# Patient Record
Sex: FEMALE | Race: WHITE | NOT HISPANIC OR LATINO | Employment: FULL TIME | URBAN - METROPOLITAN AREA
[De-identification: names, ages, dates, MRNs, and addresses within clinical notes are randomized per-mention and may not be internally consistent; named-entity substitution may affect disease eponyms.]

---

## 2017-10-31 ENCOUNTER — OFFICE VISIT (OUTPATIENT)
Dept: MEDICAL GROUP | Facility: MEDICAL CENTER | Age: 62
End: 2017-10-31
Payer: COMMERCIAL

## 2017-10-31 ENCOUNTER — HOSPITAL ENCOUNTER (OUTPATIENT)
Dept: LAB | Facility: MEDICAL CENTER | Age: 62
End: 2017-10-31
Attending: NURSE PRACTITIONER
Payer: COMMERCIAL

## 2017-10-31 VITALS
SYSTOLIC BLOOD PRESSURE: 130 MMHG | DIASTOLIC BLOOD PRESSURE: 76 MMHG | WEIGHT: 188 LBS | HEIGHT: 66 IN | HEART RATE: 88 BPM | RESPIRATION RATE: 16 BRPM | TEMPERATURE: 98.5 F | BODY MASS INDEX: 30.22 KG/M2 | OXYGEN SATURATION: 94 %

## 2017-10-31 DIAGNOSIS — Z85.6: ICD-10-CM

## 2017-10-31 DIAGNOSIS — Z12.11 SCREEN FOR COLON CANCER: ICD-10-CM

## 2017-10-31 DIAGNOSIS — Z94.6 BONE TRANSPLANT STATUS: ICD-10-CM

## 2017-10-31 DIAGNOSIS — J30.1 CHRONIC SEASONAL ALLERGIC RHINITIS DUE TO POLLEN: ICD-10-CM

## 2017-10-31 DIAGNOSIS — E66.9 OBESITY (BMI 30-39.9): ICD-10-CM

## 2017-10-31 DIAGNOSIS — Z12.39 SCREENING FOR MALIGNANT NEOPLASM OF BREAST: ICD-10-CM

## 2017-10-31 PROBLEM — E78.2 MIXED HYPERLIPIDEMIA: Status: ACTIVE | Noted: 2017-10-31

## 2017-10-31 LAB
ALBUMIN SERPL BCP-MCNC: 4.4 G/DL (ref 3.2–4.9)
ALBUMIN/GLOB SERPL: 1.3 G/DL
ALP SERPL-CCNC: 77 U/L (ref 30–99)
ALT SERPL-CCNC: 25 U/L (ref 2–50)
ANION GAP SERPL CALC-SCNC: 7 MMOL/L (ref 0–11.9)
AST SERPL-CCNC: 20 U/L (ref 12–45)
BASOPHILS # BLD AUTO: 0.6 % (ref 0–1.8)
BASOPHILS # BLD: 0.06 K/UL (ref 0–0.12)
BILIRUB SERPL-MCNC: 0.4 MG/DL (ref 0.1–1.5)
BUN SERPL-MCNC: 14 MG/DL (ref 8–22)
CALCIUM SERPL-MCNC: 9.6 MG/DL (ref 8.5–10.5)
CHLORIDE SERPL-SCNC: 107 MMOL/L (ref 96–112)
CO2 SERPL-SCNC: 28 MMOL/L (ref 20–33)
CREAT SERPL-MCNC: 0.53 MG/DL (ref 0.5–1.4)
EOSINOPHIL # BLD AUTO: 0.05 K/UL (ref 0–0.51)
EOSINOPHIL NFR BLD: 0.5 % (ref 0–6.9)
ERYTHROCYTE [DISTWIDTH] IN BLOOD BY AUTOMATED COUNT: 51.3 FL (ref 35.9–50)
GFR SERPL CREATININE-BSD FRML MDRD: >60 ML/MIN/1.73 M 2
GLOBULIN SER CALC-MCNC: 3.4 G/DL (ref 1.9–3.5)
GLUCOSE SERPL-MCNC: 91 MG/DL (ref 65–99)
HCT VFR BLD AUTO: 44.2 % (ref 37–47)
HGB BLD-MCNC: 14.8 G/DL (ref 12–16)
IMM GRANULOCYTES # BLD AUTO: 0.04 K/UL (ref 0–0.11)
IMM GRANULOCYTES NFR BLD AUTO: 0.4 % (ref 0–0.9)
LYMPHOCYTES # BLD AUTO: 2.79 K/UL (ref 1–4.8)
LYMPHOCYTES NFR BLD: 27.6 % (ref 22–41)
MCH RBC QN AUTO: 32.8 PG (ref 27–33)
MCHC RBC AUTO-ENTMCNC: 33.5 G/DL (ref 33.6–35)
MCV RBC AUTO: 98 FL (ref 81.4–97.8)
MONOCYTES # BLD AUTO: 0.89 K/UL (ref 0–0.85)
MONOCYTES NFR BLD AUTO: 8.8 % (ref 0–13.4)
NEUTROPHILS # BLD AUTO: 6.29 K/UL (ref 2–7.15)
NEUTROPHILS NFR BLD: 62.1 % (ref 44–72)
NRBC # BLD AUTO: 0 K/UL
NRBC BLD AUTO-RTO: 0 /100 WBC
PLATELET # BLD AUTO: 418 K/UL (ref 164–446)
PMV BLD AUTO: 9.2 FL (ref 9–12.9)
POTASSIUM SERPL-SCNC: 4.2 MMOL/L (ref 3.6–5.5)
PROT SERPL-MCNC: 7.8 G/DL (ref 6–8.2)
RBC # BLD AUTO: 4.51 M/UL (ref 4.2–5.4)
SODIUM SERPL-SCNC: 142 MMOL/L (ref 135–145)
WBC # BLD AUTO: 10.1 K/UL (ref 4.8–10.8)

## 2017-10-31 PROCEDURE — 80053 COMPREHEN METABOLIC PANEL: CPT

## 2017-10-31 PROCEDURE — 36415 COLL VENOUS BLD VENIPUNCTURE: CPT

## 2017-10-31 PROCEDURE — 99204 OFFICE O/P NEW MOD 45 MIN: CPT | Performed by: NURSE PRACTITIONER

## 2017-10-31 PROCEDURE — 85025 COMPLETE CBC W/AUTO DIFF WBC: CPT

## 2017-10-31 NOTE — LETTER
[employer] [facility]  BENIGNO GillespieRMACARENA.  [address]  [place] [address]  Fax: [phone]   Authorization for Release/Disclosure of   Protected Health Information   Name: ADOLFO VAUGHN : 1955 SSN: [id number]   Address: [address] [place] [id number] Phone:    [phone] (home) [phone] (work)   I authorize the entity listed below to release/disclose the PHI below to:   [facility]/BENIGNO GilelspieRSIA and KERRI Gillespie   Provider or Entity Name:  Alondra Schultz   Address   City, State, Zip   Phone:      Fax:     Reason for request: continuity of care   Information to be released:    [  ] LAST COLONOSCOPY,  including any PATH REPORT and follow-up  [  ] LAST FIT/COLOGUARD RESULT [  ] LAST DEXA  [  ] LAST MAMMOGRAM  [  ] LAST PAP  [  ] LAST LABS [  ] RETINA EXAM REPORT  [  ] IMMUNIZATION RECORDS  [ x ] Release all info      [  ] Check here and initial the line next to each item to release ALL health information INCLUDING  _____ Care and treatment for drug and / or alcohol abuse  _____ HIV testing, infection status, or AIDS  _____ Genetic Testing    DATES OF SERVICE OR TIME PERIOD TO BE DISCLOSED: _____________  I understand and acknowledge that:  * This Authorization may be revoked at any time by you in writing, except if your health information has already been used or disclosed.  * Your health information that will be used or disclosed as a result of you signing this authorization could be re-disclosed by the recipient. If this occurs, your re-disclosed health information may no longer be protected by State or Federal laws.  * You may refuse to sign this Authorization. Your refusal will not affect your ability to obtain treatment.  * This Authorization becomes effective upon signing and will  on (date) __________.      If no date is indicated, this Authorization will  one (1) year from the signature date.    Name: Adolfo  Gigi    Signature:   Date:     10/31/2017       PLEASE FAX REQUESTED RECORDS BACK TO: [phone]

## 2017-10-31 NOTE — PROGRESS NOTES
"cc:  release for dental work    Subjective:     HPI:     Samantha Yu is a 62 y.o. female here to Establish care and to discuss the evaluation and management of:    1. Hx of acute myeloid leukemia in remission  Patient states that she had myelodysplastic leukemia, and had a bone marrow transplant from her sister in 2002. She was cared for in [place] during this time. Since she's moved to [place] 2007 she has not followed up regularly with an oncologist. States she's she has done so one time and was told she was in remission and has since routinely been monitored. Does not recall the name of the oncologist. She states she was told \"she had an impressive bone scan \" from the oncologist Patient needs a crown replaced on her tooth tomorrow and her dentist is requesting her to have a release from her doctor based on her history of leukemia.    2. Chronic seasonal allergic rhinitis due to pollen  States she suffers from seasonal allergies. Occasionally uses Zyrtec and Flonase but not regularly. Complains of a stuffy nose, denies any dental pressure, sinus pressure, itchy eyes, watery nose, cough,or headache.    3. Screen for colon cancer  States she does not want a colonoscopy but is willing to try fit test. Denies any bowel changes such as blood in her stool, black tarry stools, constipation, abdominal pain or diarrhea.    4. Screening for malignant neoplasm of breast  States she is needs a mammogram. Does not remember where her records are of her previous mammo. Denies any presence of lumps, nipple discharge, breast tenderness, or dimpling.          ROS:  Denies any Headache, Blurred Vision, Confusion Chest pain,  Shortness of breath,  Abdominal pain, Changes of bowel or bladder, Lower ext edema-states she did hit her left lower shin on some stairs back in June and it caused her second toe on her left foot to be numb, the numbness went away when she was swimming during the summer, denies swelling in foot, or a open " "wound or foot drop, states sore sometime to the touch on shin, denies Fevers, Nights sweats, Weight Changes, Focal weakness or numbness.  All other systems are negative.      No current outpatient prescriptions on file.    No Known Allergies    Past Medical History:   Diagnosis Date   • Glaucoma    • Murmur    • Seasonal allergies      Past Surgical History:   Procedure Laterality Date   • TUBAL LIGATION  1996   • EYE SURGERY Bilateral     d/t pressure in eye from glaucoma     Family History   Problem Relation Age of Onset   • Cancer Mother      breast   • Cancer Maternal Grandmother      breast   • Heart Attack Paternal Grandfather    • Diabetes Paternal Grandfather      Social History     Social History   • Marital status:      Spouse name: N/A   • Number of children: N/A   • Years of education: N/A     Occupational History   • Not on file.     Social History Main Topics   • Smoking status: Never Smoker   • Smokeless tobacco: Never Used   • Alcohol use 0.6 oz/week     1 Glasses of wine per week   • Drug use: No   • Sexual activity: Yes     Partners: Male     Other Topics Concern   • Not on file     Social History Narrative   • No narrative on file       Objective:     Vitals: /76   Pulse 88   Temp 36.9 °C (98.5 °F)   Resp 16   Ht 1.676 m (5' 6\")   Wt 85.3 kg (188 lb)   SpO2 94%   BMI 30.34 kg/m²    General: Alert, pleasant, NAD  HEENT: Normocephalic.  PERRL,no icterus or pallor.  Conjunctivae and lids normal. External ears normal. Tympanic membranes pearly, opaque.  Mild turbinate edema, mucosa pink.  Oropharynx non-erythematous, mucous membranes moist.  Neck supple.  No thyromegaly or masses palpated. No cervical or supraclavicular lymphadenopathy.  Heart: Regular rate and rhythm.  S1 and S2 normal.  No murmurs appreciated.  Respiratory: Normal respiratory effort.  Clear to auscultation bilaterally.  Abdomen: Non-distended, soft, non-tender, no guarding/rebound. Bowel sounds present.  No " hepatosplenomegaly.  No hernias.  Skin: Warm, dry, no rashes.  Musculoskeletal: Gait is normal.  Moves all extremities well. Tenderness to left lower anterior shin, no evidence of ecchymosis or erythema or crepitus  Extremities: No leg edema.  Pedal pulses 2+ symmetric.   Neurological: No tremors, sensation grossly intact, patellar reflexes 2+ symmetric, tone/strength normal, gait is normal  Psych:  Affect/mood is normal, judgement is good, memory is intact, grooming is appropriate.    Assessment/Plan:     Samantha was seen today for establish care and referral needed.    Diagnoses and all orders for this visit:    Hx of acute myeloid leukemia in remission  Patient needs approval for dental work. Discussed with patient that in order for me to give her medical clearance we should at least have some labs that can show active disease or not. Pt will obtain labs today and once lab results are uploaded I will notify patient.  -     COMP METABOLIC PANEL; Future  -     CBC WITH DIFFERENTIAL; Future    Chronic seasonal allergic rhinitis due to pollen   Chronic. Continue Flonase and Zyrtec, discussed option of using saline rinses as well. Follow-up as needed  Screen for colon cancer  Patient denies referral for colonoscopy states that she will attempt to do the FIT test. Denies any bowel changes, constipation, blood in her stool, black or tarry stools, abdominal pain, or hemorrhoids.    -     OCCULT BLOOD FECES IMMUNOASSAY (FIT); Future    Screening for malignant neoplasm of breast  Does not have any current records or is aware of where her most recent mammogram was however states it's been several years.    -     MA-SCREEN MAMMO W/CAD-BILAT; Future    Obesity (BMI 30-39.9)  Chronic. Mild obesity. Discussed increasing exercise routine and maintaining a heart healthy diet.  -     Patient identified as having weight management issue.  Appropriate orders and counseling given.        Return in about 1 month (around  11/30/2017).        Amalia MANNING

## 2017-11-02 PROBLEM — Z94.6: Status: ACTIVE | Noted: 2017-11-02

## 2018-08-16 ENCOUNTER — HOSPITAL ENCOUNTER (OUTPATIENT)
Dept: RADIOLOGY | Facility: MEDICAL CENTER | Age: 63
End: 2018-08-16

## 2018-08-16 ENCOUNTER — OFFICE VISIT (OUTPATIENT)
Dept: MEDICAL GROUP | Facility: MEDICAL CENTER | Age: 63
End: 2018-08-16
Payer: COMMERCIAL

## 2018-08-16 ENCOUNTER — HOSPITAL ENCOUNTER (OUTPATIENT)
Dept: RADIOLOGY | Facility: MEDICAL CENTER | Age: 63
End: 2018-08-16
Attending: NURSE PRACTITIONER
Payer: COMMERCIAL

## 2018-08-16 VITALS
HEIGHT: 66 IN | DIASTOLIC BLOOD PRESSURE: 80 MMHG | BODY MASS INDEX: 32.14 KG/M2 | WEIGHT: 200 LBS | RESPIRATION RATE: 14 BRPM | TEMPERATURE: 98.9 F | OXYGEN SATURATION: 98 % | SYSTOLIC BLOOD PRESSURE: 126 MMHG | HEART RATE: 84 BPM

## 2018-08-16 DIAGNOSIS — Z12.39 SCREENING FOR MALIGNANT NEOPLASM OF BREAST: ICD-10-CM
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
## 2018-08-16 DIAGNOSIS — L03.011 CELLULITIS OF RIGHT MIDDLE FINGER: ICD-10-CM

## 2018-08-16 PROCEDURE — 99213 OFFICE O/P EST LOW 20 MIN: CPT | Performed by: FAMILY MEDICINE

## 2018-08-16 PROCEDURE — 77067 SCR MAMMO BI INCL CAD: CPT

## 2018-08-16 RX ORDER — AMOXICILLIN AND CLAVULANATE POTASSIUM 875; 125 MG/1; MG/1
1 TABLET, FILM COATED ORAL 2 TIMES DAILY
Qty: 20 TAB | Refills: 0 | Status: SHIPPED | OUTPATIENT
Start: 2018-08-16 | End: 2018-08-20

## 2018-08-16 ASSESSMENT — PATIENT HEALTH QUESTIONNAIRE - PHQ9: CLINICAL INTERPRETATION OF PHQ2 SCORE: 0

## 2018-08-16 NOTE — PROGRESS NOTES
"CC: Cat Bite 5 days ago    HPI:   Samantha is a 63-year-old white female who presents today with the following.    6 days ago she was trying to help a stray kitten down from a fence.  The kitten was part of a group of cats including the mother.  The kitten was screaming and seemed very distressed.  When the patient picked up the kitten, the kitten bit her on the tip of her right middle finger.  She is now having pain and swelling and tenderness.  She is up-to-date on her tetanus immunization.  She denies fever or chills or body aches.      Patient Active Problem List    Diagnosis Date Noted   • Bone transplant status 11/02/2017   • Mixed hyperlipidemia 10/31/2017   • Chronic seasonal allergic rhinitis due to pollen 10/31/2017   • Obesity (BMI 30-39.9) 10/31/2017   • Hx of acute myeloid leukemia in remission 10/31/2017       Current Outpatient Prescriptions   Medication Sig Dispense Refill   • amoxicillin-clavulanate (AUGMENTIN) 875-125 MG Tab Take 1 Tab by mouth 2 times a day. 20 Tab 0     No current facility-administered medications for this visit.          Allergies as of 08/16/2018   • (No Known Allergies)            /80   Pulse 84   Temp 37.2 °C (98.9 °F)   Resp 14   Ht 1.676 m (5' 6\")   Wt 90.7 kg (200 lb)   SpO2 98%   BMI 32.28 kg/m²     Physical Exam:  Gen:         Alert and oriented, No apparent distress.            Ext:          No clubbing, cyanosis, edema.  Puncture wounds on the distal and of the right middle finger with surrounding erythema and swelling.  There is tenderness to palpation.  There is normal range of motion of the finger.      Assessment and Plan.   63 y.o. female with the following issues.    1. Cat bite, initial encounter  -Health department notified.  Form faxed    2. Cellulitis of right middle finger    - amoxicillin-clavulanate (AUGMENTIN) 875-125 MG Tab; Take 1 Tab by mouth 2 times a day.  Dispense: 20 Tab; Refill: 0      Patient is instructed to follow-up if symptoms not " starting to resolve within the next 48 hours or if they worsen in any way.

## 2018-08-20 ENCOUNTER — PATIENT MESSAGE (OUTPATIENT)
Dept: MEDICAL GROUP | Facility: MEDICAL CENTER | Age: 63
End: 2018-08-20

## 2018-08-20 RX ORDER — CEFUROXIME AXETIL 500 MG/1
500 TABLET ORAL 2 TIMES DAILY
Qty: 10 TAB | Refills: 0 | Status: SHIPPED | OUTPATIENT
Start: 2018-08-20 | End: 2018-12-16

## 2018-08-20 NOTE — TELEPHONE ENCOUNTER
VOICEMAIL  1. Caller Name: Samantha                      Call Back Number: [phone]      2. Message: Requesting alternative abx from what Dr Krishnamurthy prescribed before 4pm today as it is giving her issues. Too strong.    3. Patient approves office to leave a detailed voicemail/MyChart message: N\A    4. Patient emailed PCP who sent in alternative medication to try. No further action taken at this moment.

## 2018-12-14 ENCOUNTER — NON-PROVIDER VISIT (OUTPATIENT)
Dept: MEDICAL GROUP | Facility: MEDICAL CENTER | Age: 63
End: 2018-12-14
Payer: COMMERCIAL

## 2018-12-14 DIAGNOSIS — Z23 NEED FOR VACCINATION: ICD-10-CM

## 2018-12-14 PROCEDURE — 90632 HEPA VACCINE ADULT IM: CPT | Performed by: NURSE PRACTITIONER

## 2018-12-14 PROCEDURE — 90471 IMMUNIZATION ADMIN: CPT | Performed by: NURSE PRACTITIONER

## 2018-12-14 PROCEDURE — 90746 HEPB VACCINE 3 DOSE ADULT IM: CPT | Performed by: NURSE PRACTITIONER

## 2018-12-14 PROCEDURE — 90472 IMMUNIZATION ADMIN EACH ADD: CPT | Performed by: NURSE PRACTITIONER

## 2018-12-14 NOTE — NON-PROVIDER
"Samantha Yu is a 63 y.o. female here for a non-provider visit for:   HEPATITIS A 1 of 1y      Reason for immunization: continue or complete series started at the office  Immunization records indicate need for vaccine: Yes, confirmed with Epic  Minimum interval has been met for this vaccine: Yes  ABN completed: Yes    Order and dose verified by: CD  VIS Dated  12/14/18 was given to patient: Yes  All IAC Questionnaire questions were answered \"No.\"    Patient tolerated injection and no adverse effects were observed or reported: Yes    Pt scheduled for next dose in series: No    "

## 2018-12-16 ENCOUNTER — APPOINTMENT (OUTPATIENT)
Dept: RADIOLOGY | Facility: IMAGING CENTER | Age: 63
End: 2018-12-16
Attending: NURSE PRACTITIONER
Payer: COMMERCIAL

## 2018-12-16 ENCOUNTER — OFFICE VISIT (OUTPATIENT)
Dept: URGENT CARE | Facility: PHYSICIAN GROUP | Age: 63
End: 2018-12-16
Payer: COMMERCIAL

## 2018-12-16 VITALS
RESPIRATION RATE: 17 BRPM | HEART RATE: 90 BPM | BODY MASS INDEX: 32.14 KG/M2 | TEMPERATURE: 98.7 F | SYSTOLIC BLOOD PRESSURE: 122 MMHG | HEIGHT: 66 IN | OXYGEN SATURATION: 95 % | DIASTOLIC BLOOD PRESSURE: 74 MMHG | WEIGHT: 200 LBS

## 2018-12-16 DIAGNOSIS — S92.351A CLOSED DISPLACED FRACTURE OF FIFTH METATARSAL BONE OF RIGHT FOOT, INITIAL ENCOUNTER: ICD-10-CM

## 2018-12-16 DIAGNOSIS — S92.341A CLOSED DISPLACED FRACTURE OF FOURTH METATARSAL BONE OF RIGHT FOOT, INITIAL ENCOUNTER: ICD-10-CM

## 2018-12-16 DIAGNOSIS — S99.921A INJURY OF RIGHT FOOT, INITIAL ENCOUNTER: ICD-10-CM

## 2018-12-16 DIAGNOSIS — S50.311A ABRASION OF RIGHT ELBOW, INITIAL ENCOUNTER: ICD-10-CM

## 2018-12-16 PROCEDURE — 73630 X-RAY EXAM OF FOOT: CPT | Mod: 26,RT | Performed by: NURSE PRACTITIONER

## 2018-12-16 PROCEDURE — 99204 OFFICE O/P NEW MOD 45 MIN: CPT | Performed by: NURSE PRACTITIONER

## 2018-12-16 RX ORDER — HYDROCODONE BITARTRATE AND ACETAMINOPHEN 5; 325 MG/1; MG/1
1-2 TABLET ORAL EVERY 6 HOURS PRN
Qty: 12 TAB | Refills: 0 | Status: SHIPPED | OUTPATIENT
Start: 2018-12-16 | End: 2018-12-21

## 2018-12-16 NOTE — PROGRESS NOTES
Chief Complaint   Patient presents with   • Foot Problem       HISTORY OF PRESENT ILLNESS: Patient is a 63 y.o. female who presents to urgent care today with complaints of right foot pain. States she accidentally hurt her right foot as she was walking down her stairs, tripping on one stair, and hitting her right foot. She immediately developed pain to her foot, has had pain to the area since with associated swelling. She denies numbness or tingling. She denies previous injuries to this foot. Also notes hitting her right elbow during the fall but pain is minimal. She has tried using her 's hydrocodone last night for pain and a cane for pain relief.     Patient Active Problem List    Diagnosis Date Noted   • Bone transplant status 11/02/2017   • Mixed hyperlipidemia 10/31/2017   • Chronic seasonal allergic rhinitis due to pollen 10/31/2017   • Obesity (BMI 30-39.9) 10/31/2017   • Hx of acute myeloid leukemia in remission 10/31/2017       Allergies:Patient has no known allergies.    No current [facility]-ordered outpatient prescriptions on file.     No current [facility]-ordered facility-administered medications on file.        Past Medical History:   Diagnosis Date   • Glaucoma    • Murmur    • Seasonal allergies        Social History   Substance Use Topics   • Smoking status: Never Smoker   • Smokeless tobacco: Never Used   • Alcohol use 0.6 oz/week     1 Glasses of wine per week       Family Status   Relation Status   • Mo Alive   • Fa Alive   • Sis Alive   • Bro Alive   • Son Alive   • Kathleen Alive   • MGMo (Not Specified)   • PGFa (Not Specified)     Family History   Problem Relation Age of Onset   • Cancer Mother         breast   • Cancer Maternal Grandmother         breast   • Heart Attack Paternal Grandfather    • Diabetes Paternal Grandfather        ROS:  Review of Systems   Constitutional: Negative for fever, chills, weight loss, malaise, and fatigue.   HENT: Negative for ear pain, nosebleeds, congestion, sore throat  "and neck pain.    Eyes: Negative for vision changes.   Neuro: Negative for headache, sensory changes, weakness, seizure, LOC.   Cardiovascular: Negative for chest pain, palpitations, orthopnea and leg swelling.   Respiratory: Negative for cough, sputum production, shortness of breath and wheezing.   Gastrointestinal: Negative for abdominal pain, nausea, vomiting or diarrhea.   Genitourinary: Negative for dysuria, urgency and frequency.  Musculoskeletal: Positive for falls, elbow pain, foot pain. Negative for neck pain, back pain, myalgias.   Skin: Negative for rash, diaphoresis.     Exam:  Blood pressure 122/74, pulse 90, temperature 37.1 °C (98.7 °F), temperature source Temporal, resp. rate 17, height 1.676 m (5' 6\"), weight 90.7 kg (200 lb), SpO2 95 %.  General: well-nourished, well-developed female in NAD  Head: normocephalic, atraumatic  Eyes: PERRLA, no conjunctival injection, acuity grossly intact, lids normal.  Ears: normal shape and symmetry, no tenderness, no discharge. External canals are without any significant edema or erythema. Tympanic membranes are without any inflammation, no effusion. Gross auditory acuity is intact.  Nose: symmetrical without tenderness, no discharge.  Mouth/Throat: reasonable hygiene, no erythema, exudates or tonsillar enlargement.  Neck: no masses, range of motion within normal limits, no tracheal deviation. No obvious thyroid enlargement.   Lymph: no cervical adenopathy. No supraclavicular adenopathy.   Neuro: alert and oriented. Cranial nerves 1-12 grossly intact. No sensory deficit.   Cardiovascular: regular rate and rhythm. No edema.  Pulmonary: no distress. Chest is symmetrical with respiration, no wheezes, crackles, or rhonchi.   Musculoskeletal: no clubbing, appropriate muscle tone, gait is antalgic with use of cane. There is no bony tenderness to right elbow, full ROM. No spinal tenderness. There is no tenderness to right knee, tib/fib/ or right ankle. There is swelling " "and tenderness to right 2-5 metatarsals. Distal N/V intact. Cap refill brisk. No ecchymosis.   Skin: warm, dry, no clubbing, no cyanosis, no rashes. Superficial abrasion noted to right elbow.   Psych: appropriate mood, affect, judgement.         Assessment/Plan:  1. Closed displaced fracture of fifth metatarsal bone of right foot, initial encounter  DX-FOOT-COMPLETE 3+ RIGHT    REFERRAL TO ORTHOPEDICS    HYDROcodone-acetaminophen (NORCO) 5-325 MG Tab per tablet    Consent for Opiate Prescription   2. Closed displaced fracture of fourth metatarsal bone of right foot, initial encounter  REFERRAL TO ORTHOPEDICS    HYDROcodone-acetaminophen (NORCO) 5-325 MG Tab per tablet    Consent for Opiate Prescription   3. Abrasion of right elbow, initial encounter             Dx foot reviewed by myself, radiology reading \"1.  Slightly displaced oblique or spiral fractures of the distal diaphyses of the right 4th and 5th metatarsals. 2.  No articular communication.\"          X-ray notes displaced fracture of fourth and fifth right metatarsals.  Patient is placed in a walking boot, given crutches, instructed to be nonweightbearing.  RICE.  OTC NSAIDs.  Patient is given a short course of Norco, side effects of medication discussed, consent obtained and signed.  Referral placed to ortho, follow-up tomorrow. [place]  Aware web site evaluation: I have obtained and reviewed patient utilization report from [facility] pharmacy database prior to writing prescription for controlled substance II, III or IV per [place] bill . Based on the report and my clinical assessment the prescription is medically necessary. Wound care discussed regarding abrasion.   Supportive care, differential diagnoses, and indications for immediate follow-up discussed with patient.   Pathogenesis of diagnosis discussed including typical length and natural progression.   Instructed to return to clinic or nearest emergency department for any change in condition, " further concerns, or worsening of symptoms.  Patient states understanding of the plan of care and discharge instructions.          Please note that this dictation was created using voice recognition software. I have made every reasonable attempt to correct obvious errors, but I expect that there are errors of grammar and possibly content that I did not discover before finalizing the note.      SHERICE Sanches.

## 2018-12-26 DIAGNOSIS — Z01.810 PRE-OPERATIVE CARDIOVASCULAR EXAMINATION: ICD-10-CM

## 2018-12-26 DIAGNOSIS — Z01.812 PRE-OPERATIVE LABORATORY EXAMINATION: ICD-10-CM

## 2018-12-26 LAB
EKG IMPRESSION: NORMAL
ERYTHROCYTE [DISTWIDTH] IN BLOOD BY AUTOMATED COUNT: 53.4 FL (ref 35.9–50)
HCT VFR BLD AUTO: 47.2 % (ref 37–47)
HGB BLD-MCNC: 15 G/DL (ref 12–16)
MCH RBC QN AUTO: 31.2 PG (ref 27–33)
MCHC RBC AUTO-ENTMCNC: 31.8 G/DL (ref 33.6–35)
MCV RBC AUTO: 98.1 FL (ref 81.4–97.8)
PLATELET # BLD AUTO: 449 K/UL (ref 164–446)
PMV BLD AUTO: 8.9 FL (ref 9–12.9)
RBC # BLD AUTO: 4.81 M/UL (ref 4.2–5.4)
SCCMEC + MECA PNL NOSE NAA+PROBE: NEGATIVE
SCCMEC + MECA PNL NOSE NAA+PROBE: NEGATIVE
WBC # BLD AUTO: 8.3 K/UL (ref 4.8–10.8)

## 2018-12-26 PROCEDURE — 87640 STAPH A DNA AMP PROBE: CPT

## 2018-12-26 PROCEDURE — 85027 COMPLETE CBC AUTOMATED: CPT

## 2018-12-26 PROCEDURE — 36415 COLL VENOUS BLD VENIPUNCTURE: CPT

## 2018-12-26 PROCEDURE — 93010 ELECTROCARDIOGRAM REPORT: CPT | Performed by: INTERNAL MEDICINE

## 2018-12-26 PROCEDURE — 87641 MR-STAPH DNA AMP PROBE: CPT

## 2018-12-26 PROCEDURE — 93005 ELECTROCARDIOGRAM TRACING: CPT

## 2018-12-26 PROCEDURE — 80053 COMPREHEN METABOLIC PANEL: CPT

## 2018-12-26 RX ORDER — TRAMADOL HYDROCHLORIDE 50 MG/1
50 TABLET ORAL PRN
COMMUNITY
End: 2019-06-21

## 2018-12-26 RX ORDER — HYDROCODONE BITARTRATE AND ACETAMINOPHEN 10; 325 MG/1; MG/1
1-2 TABLET ORAL EVERY 6 HOURS PRN
COMMUNITY
End: 2018-12-26

## 2018-12-26 RX ORDER — OXYCODONE HYDROCHLORIDE AND ACETAMINOPHEN 5; 325 MG/1; MG/1
1-2 TABLET ORAL EVERY 4 HOURS PRN
COMMUNITY
End: 2019-06-21

## 2018-12-26 RX ORDER — NAPROXEN 250 MG/1
250 TABLET ORAL PRN
COMMUNITY
End: 2019-06-21

## 2018-12-26 RX ORDER — TRAVOPROST OPHTHALMIC SOLUTION 0.04 MG/ML
1 SOLUTION OPHTHALMIC DAILY
COMMUNITY

## 2018-12-26 NOTE — OR NURSING
Pre admit apt: Pt. Instructed to continue regularly prescribed medications through day before surgery.  Instructed to take the following medications, the day of surgery, with a sip of water per anesthesia protocol: tramadol, oxycodone

## 2018-12-27 ENCOUNTER — APPOINTMENT (OUTPATIENT)
Dept: RADIOLOGY | Facility: MEDICAL CENTER | Age: 63
End: 2018-12-27
Attending: ORTHOPAEDIC SURGERY
Payer: COMMERCIAL

## 2018-12-27 ENCOUNTER — HOSPITAL ENCOUNTER (OUTPATIENT)
Facility: MEDICAL CENTER | Age: 63
End: 2018-12-27
Attending: ORTHOPAEDIC SURGERY | Admitting: ORTHOPAEDIC SURGERY
Payer: COMMERCIAL

## 2018-12-27 VITALS
TEMPERATURE: 97.9 F | OXYGEN SATURATION: 91 % | BODY MASS INDEX: 32.21 KG/M2 | RESPIRATION RATE: 18 BRPM | HEART RATE: 96 BPM | HEIGHT: 66 IN | DIASTOLIC BLOOD PRESSURE: 66 MMHG | WEIGHT: 200.4 LBS | SYSTOLIC BLOOD PRESSURE: 139 MMHG

## 2018-12-27 LAB
ALBUMIN SERPL BCP-MCNC: 4.5 G/DL (ref 3.2–4.9)
ALBUMIN/GLOB SERPL: 1.3 G/DL
ALP SERPL-CCNC: 73 U/L (ref 30–99)
ALT SERPL-CCNC: 38 U/L (ref 2–50)
ANION GAP SERPL CALC-SCNC: 15 MMOL/L (ref 0–11.9)
AST SERPL-CCNC: 32 U/L (ref 12–45)
BILIRUB SERPL-MCNC: 0.5 MG/DL (ref 0.1–1.5)
BUN SERPL-MCNC: 15 MG/DL (ref 8–22)
CALCIUM SERPL-MCNC: 10.1 MG/DL (ref 8.5–10.5)
CHLORIDE SERPL-SCNC: 107 MMOL/L (ref 96–112)
CO2 SERPL-SCNC: 20 MMOL/L (ref 20–33)
CREAT SERPL-MCNC: 0.64 MG/DL (ref 0.5–1.4)
GLOBULIN SER CALC-MCNC: 3.6 G/DL (ref 1.9–3.5)
GLUCOSE SERPL-MCNC: 89 MG/DL (ref 65–99)
POTASSIUM SERPL-SCNC: 4.5 MMOL/L (ref 3.6–5.5)
PROT SERPL-MCNC: 8.1 G/DL (ref 6–8.2)
SODIUM SERPL-SCNC: 142 MMOL/L (ref 135–145)

## 2018-12-27 PROCEDURE — 500881 HCHG PACK, EXTREMITY: Performed by: ORTHOPAEDIC SURGERY

## 2018-12-27 PROCEDURE — A9270 NON-COVERED ITEM OR SERVICE: HCPCS | Performed by: ANESTHESIOLOGY

## 2018-12-27 PROCEDURE — 160022 HCHG BLOCK: Performed by: ORTHOPAEDIC SURGERY

## 2018-12-27 PROCEDURE — 160039 HCHG SURGERY MINUTES - EA ADDL 1 MIN LEVEL 3: Performed by: ORTHOPAEDIC SURGERY

## 2018-12-27 PROCEDURE — 160035 HCHG PACU - 1ST 60 MINS PHASE I: Performed by: ORTHOPAEDIC SURGERY

## 2018-12-27 PROCEDURE — 160028 HCHG SURGERY MINUTES - 1ST 30 MINS LEVEL 3: Performed by: ORTHOPAEDIC SURGERY

## 2018-12-27 PROCEDURE — 160002 HCHG RECOVERY MINUTES (STAT): Performed by: ORTHOPAEDIC SURGERY

## 2018-12-27 PROCEDURE — 700111 HCHG RX REV CODE 636 W/ 250 OVERRIDE (IP)

## 2018-12-27 PROCEDURE — 700101 HCHG RX REV CODE 250: Performed by: ORTHOPAEDIC SURGERY

## 2018-12-27 PROCEDURE — 160046 HCHG PACU - 1ST 60 MINS PHASE II: Performed by: ORTHOPAEDIC SURGERY

## 2018-12-27 PROCEDURE — 160036 HCHG PACU - EA ADDL 30 MINS PHASE I: Performed by: ORTHOPAEDIC SURGERY

## 2018-12-27 PROCEDURE — 502000 HCHG MISC OR IMPLANTS RC 0278: Performed by: ORTHOPAEDIC SURGERY

## 2018-12-27 PROCEDURE — 160048 HCHG OR STATISTICAL LEVEL 1-5: Performed by: ORTHOPAEDIC SURGERY

## 2018-12-27 PROCEDURE — 160009 HCHG ANES TIME/MIN: Performed by: ORTHOPAEDIC SURGERY

## 2018-12-27 PROCEDURE — 502240 HCHG MISC OR SUPPLY RC 0272: Performed by: ORTHOPAEDIC SURGERY

## 2018-12-27 PROCEDURE — 501838 HCHG SUTURE GENERAL: Performed by: ORTHOPAEDIC SURGERY

## 2018-12-27 PROCEDURE — 700102 HCHG RX REV CODE 250 W/ 637 OVERRIDE(OP): Performed by: ANESTHESIOLOGY

## 2018-12-27 PROCEDURE — 160047 HCHG PACU  - EA ADDL 30 MINS PHASE II: Performed by: ORTHOPAEDIC SURGERY

## 2018-12-27 PROCEDURE — 73620 X-RAY EXAM OF FOOT: CPT | Mod: RT

## 2018-12-27 PROCEDURE — 160025 RECOVERY II MINUTES (STATS): Performed by: ORTHOPAEDIC SURGERY

## 2018-12-27 RX ORDER — HYDROMORPHONE HYDROCHLORIDE 1 MG/ML
0.4 INJECTION, SOLUTION INTRAMUSCULAR; INTRAVENOUS; SUBCUTANEOUS
Status: DISCONTINUED | OUTPATIENT
Start: 2018-12-27 | End: 2018-12-27 | Stop reason: HOSPADM

## 2018-12-27 RX ORDER — SODIUM CHLORIDE, SODIUM LACTATE, POTASSIUM CHLORIDE, CALCIUM CHLORIDE 600; 310; 30; 20 MG/100ML; MG/100ML; MG/100ML; MG/100ML
INJECTION, SOLUTION INTRAVENOUS
Status: DISCONTINUED | OUTPATIENT
Start: 2018-12-27 | End: 2018-12-27 | Stop reason: HOSPADM

## 2018-12-27 RX ORDER — ONDANSETRON 2 MG/ML
4 INJECTION INTRAMUSCULAR; INTRAVENOUS
Status: DISCONTINUED | OUTPATIENT
Start: 2018-12-27 | End: 2018-12-27 | Stop reason: HOSPADM

## 2018-12-27 RX ORDER — SODIUM CHLORIDE, SODIUM LACTATE, POTASSIUM CHLORIDE, CALCIUM CHLORIDE 600; 310; 30; 20 MG/100ML; MG/100ML; MG/100ML; MG/100ML
INJECTION, SOLUTION INTRAVENOUS CONTINUOUS
Status: DISCONTINUED | OUTPATIENT
Start: 2018-12-27 | End: 2018-12-27 | Stop reason: HOSPADM

## 2018-12-27 RX ORDER — OXYCODONE HCL 5 MG/5 ML
5 SOLUTION, ORAL ORAL
Status: COMPLETED | OUTPATIENT
Start: 2018-12-27 | End: 2018-12-27

## 2018-12-27 RX ORDER — DIPHENHYDRAMINE HYDROCHLORIDE 50 MG/ML
12.5 INJECTION INTRAMUSCULAR; INTRAVENOUS
Status: DISCONTINUED | OUTPATIENT
Start: 2018-12-27 | End: 2018-12-27 | Stop reason: HOSPADM

## 2018-12-27 RX ORDER — HYDROMORPHONE HYDROCHLORIDE 1 MG/ML
0.1 INJECTION, SOLUTION INTRAMUSCULAR; INTRAVENOUS; SUBCUTANEOUS
Status: DISCONTINUED | OUTPATIENT
Start: 2018-12-27 | End: 2018-12-27 | Stop reason: HOSPADM

## 2018-12-27 RX ORDER — HYDROMORPHONE HYDROCHLORIDE 1 MG/ML
0.2 INJECTION, SOLUTION INTRAMUSCULAR; INTRAVENOUS; SUBCUTANEOUS
Status: DISCONTINUED | OUTPATIENT
Start: 2018-12-27 | End: 2018-12-27 | Stop reason: HOSPADM

## 2018-12-27 RX ORDER — MEPERIDINE HYDROCHLORIDE 25 MG/ML
12.5 INJECTION INTRAMUSCULAR; INTRAVENOUS; SUBCUTANEOUS
Status: DISCONTINUED | OUTPATIENT
Start: 2018-12-27 | End: 2018-12-27 | Stop reason: HOSPADM

## 2018-12-27 RX ORDER — OXYCODONE HCL 5 MG/5 ML
10 SOLUTION, ORAL ORAL
Status: COMPLETED | OUTPATIENT
Start: 2018-12-27 | End: 2018-12-27

## 2018-12-27 RX ADMIN — FENTANYL CITRATE 25 MCG: 50 INJECTION, SOLUTION INTRAMUSCULAR; INTRAVENOUS at 17:14

## 2018-12-27 RX ADMIN — OXYCODONE HYDROCHLORIDE 5 MG: 5 SOLUTION ORAL at 17:23

## 2018-12-27 RX ADMIN — SODIUM CHLORIDE, SODIUM LACTATE, POTASSIUM CHLORIDE, CALCIUM CHLORIDE 1000 ML: 600; 310; 30; 20 INJECTION, SOLUTION INTRAVENOUS at 14:59

## 2018-12-27 RX ADMIN — LIDOCAINE HYDROCHLORIDE 0.5 ML: 10 INJECTION, SOLUTION INFILTRATION; PERINEURAL at 14:58

## 2018-12-27 ASSESSMENT — PAIN SCALES - GENERAL
PAINLEVEL_OUTOF10: 5
PAINLEVEL_OUTOF10: 6
PAINLEVEL_OUTOF10: 0
PAINLEVEL_OUTOF10: 5
PAINLEVEL_OUTOF10: 7

## 2018-12-27 NOTE — OR NURSING
1500 Patient allergies and NPO status verified, home medication reconciliation completed and belongings secured. Surgical site verified with patient. Patient verbalizes understanding of pain scale, expected course of stay and plan of care; patient and family state verbal understanding at this time. IV access established. Sequential in place as ordered.

## 2018-12-28 NOTE — OR NURSING
1823  Pt to stage two via saulo. Pt states pain is tolerable and denies nausea at this time. Pt getting dressed with help of CNA.    1830 Pt up to chair with help of CNA. VSS.   1838 Explained discharge instructions to pt and pts  . Both express understanding   1840 Pts oxygen saturation between 86-93 %. Instructed pt on use of IS. Pt using IS appropriately  1850 Report to KAMLESH Santa who assumed care of pt

## 2018-12-28 NOTE — OR NURSING
"1655 To PACU from OR via gurney, side rails up x 2 for safety, lungs clear bilaterally, scds on patient and machine operational, dressing CDI to R foot with surgical shoe and ice pack in place. R foot elevated above heart level with pillows and gurney. R foot has pink/warm toes and brisk cap refill. Pt able to arouse to voice and respond appropriately with eyes closed. Pt denies pain or nausea.   1710 Pt reports moderate pain to \"bottom of my foot\"; able to weakly wiggle R toes post nerve block.   1725 Pain rated as 5/10 and not tolerable; given add'l PRN IV medication and oral PRN pain medication as well for comfort. Pt resting with eyes closed; arouses easily to voice. Denies nausea. Instructed to DB/C; demonstrated understanding.   1740 Pt arouses easily to voice and reports pain as 5-6/10 to lateral side of right foot. Denies nausea. Given add'l PRN pain medication.   1755 Pt reports pain as \"it hurts\" agrees that it's tolerable but pain goal 3-4/10 and rates as 5/10. Pulse ox 86-91% supine and resting with eyes closed. Pulse ox rebounds into 90s with deep breaths.   1810 Pain rated as tolerable at 5/10. Denies nausea. HOB elevated from 10 degrees to 30.   1820 Meets criteria for transfer to stage II.         "

## 2018-12-28 NOTE — OR NURSING
1850 Assumed care of patient; pt sitting up in recliner awake and appropriate. Pain rated as tolerable and denies nausea. Pulse ox 85-93% on RA at rest.   1857 Using IS x 10 as instructed; demonstrates understanding. IS max 2250. Given printed instructions for Incentive spirometer from [facility]; pt/spouse states verbal understanding.    1920 SBA up with walker to BR; ambulated with NWB to RLE; steady with walker.   1931 Pt able to void without difficulty. Returned to recliner and pulse ox at 94% on RA.   1945 No desaturations noted on continuous pulse ox. 91-94% on RA. Remains awake and talking with RN. Pt reports having and recliner at home as well as an adjustable bed. Pt instructed to elevate HOB for effective ventilation as well as elevating RLE; pt states verbal understanding.   0955 Pt meets criteria for discharge home. Pulse ox 90-91% on RA.   2004 D/Daquan to care of family post uneventful stay in PACU 2.

## 2018-12-28 NOTE — DISCHARGE INSTRUCTIONS
ACTIVITY: Rest and take it easy for the first 24 hours.  A responsible adult is recommended to remain with you during that time.  It is normal to feel sleepy.  We encourage you to not do anything that requires balance, judgment or coordination.    MILD FLU-LIKE SYMPTOMS ARE NORMAL. YOU MAY EXPERIENCE GENERALIZED MUSCLE ACHES, THROAT IRRITATION, HEADACHE AND/OR SOME NAUSEA.    FOR 24 HOURS DO NOT:  Drive, operate machinery or run household appliances.  Drink beer or alcoholic beverages.   Make important decisions or sign legal documents.    SPECIAL INSTRUCTIONS: Non weight bearing to surgical extremity. Elevate foot above heart level and ice pack 20 minutes on/20 minutes off; NO ICE TO TOES.     DIET: To avoid nausea, slowly advance diet as tolerated, avoiding spicy or greasy foods for the first day.  Add more substantial food to your diet according to your physician's instructions. INCREASE FLUIDS AND FIBER TO AVOID CONSTIPATION.    SURGICAL DRESSING/BATHING: Keep dressing clean, dry and intact until otherwise instructed by Dr Amado. May shower with incisions covered.     FOLLOW-UP APPOINTMENT:  A follow-up appointment should be arranged with your doctor in office as instructed; call to schedule.    You should CALL YOUR PHYSICIAN if you develop:  Fever greater than 101 degrees F.  Pain not relieved by medication, or persistent nausea or vomiting.  Excessive bleeding (blood soaking through dressing) or unexpected drainage from the wound.  Extreme redness or swelling around the incision site, drainage of pus or foul smelling drainage.  Inability to urinate or empty your bladder within 8 hours.  Problems with breathing or chest pain.    You should call 911 if you develop problems with breathing or chest pain.  If you are unable to contact your doctor or surgical center, you should go to the nearest emergency room or urgent care center.  Dr Amado's telephone #: [phone]    If any questions arise, call your doctor.   If your doctor is not available, please feel free to call the Surgical Center at [phone].  The Center is open Monday through Friday from 7AM to 7PM.  You can also call the HEALTH HOTLINE open 24 hours/day, 7 days/week and speak to a nurse at [phone], or toll free at [phone].    A registered nurse may call you a few days after your surgery to see how you are doing after your procedure.    MEDICATIONS: Resume taking daily medication.  Take prescribed pain medication with food.  If no medication is prescribed, you may take non-aspirin pain medication if needed.  PAIN MEDICATION CAN BE VERY CONSTIPATING.  Take a stool softener or laxative such as senokot, pericolace, or milk of magnesia if needed.    Prescription given for Home.  Last pain medication given at 5:23pm. Oxycodone 5mg    If your physician has prescribed pain medication that includes Acetaminophen (Tylenol), do not take additional Acetaminophen (Tylenol) while taking the prescribed medication.    Depression / Suicide Risk    As you are discharged from this [facility] facility, it is important to learn how to keep safe from harming yourself.    Recognize the warning signs:  · Abrupt changes in personality, positive or negative- including increase in energy   · Giving away possessions  · Change in eating patterns- significant weight changes-  positive or negative  · Change in sleeping patterns- unable to sleep or sleeping all the time   · Unwillingness or inability to communicate  · Depression  · Unusual sadness, discouragement and loneliness  · Talk of wanting to die  · Neglect of personal appearance   · Rebelliousness- reckless behavior  · Withdrawal from people/activities they love  · Confusion- inability to concentrate     If you or a loved one observes any of these behaviors or has concerns about self-harm, here's what you can do:  · Talk about it- your feelings and reasons for harming yourself  · Remove any means that you might  use to hurt yourself (examples: pills, rope, extension cords, firearm)  · Get professional help from the community (Mental Health, Substance Abuse, psychological counseling)  · Do not be alone:Call your Safe Contact- someone whom you trust who will be there for you.  · Call your local CRISIS HOTLINE 412-4594 or 150-678-7214  · Call your local Children's Mobile Crisis Response Team [place] (039) 510-7804 or www.Setgo  · Call the toll free National Suicide Prevention Hotlines   · National Suicide Prevention Lifeline 734-750-BEBR (2296)  [place] [employer] 800-SUICIDE (096-9473)    Peripheral Nerve Block Discharge Instructions from Same Day Surgery and Inpatient :    What to Expect - Lower Extremity  · The block may cause you to experience numbness and weakness in your thigh and knee or calf and foot on the same side as your surgery  · Numbness, tingling and / or weakness are all normal. For some people, this may be an unpleasant sensation  · These issues will be resolved when the local anesthetic wears off   · You may experience numbness and tingling in your thigh on the same side as your surgery if the block medicine was injected at your groin area  · Numbness will make it difficult to walk  · You may have problems with balance and walking so be very careful   · Follow your surgeon's direction regarding weight bearing on your surgical limb  · Be very careful with your numb limb  Precautions  · The numbness may affect your balance  · Be careful when walking or moving around  · Your leg may be weak: be very careful putting weight on it  · If your surgeon did not specify a time, you should not bear weight for 24 hours  · Be sure to ask for help when you need it  · It is better to have help than to fall and hurt yourself  Prevent Injury  · Protect the limb like a baby  · Beware of exposing your limb to extreme heat or cold or trauma  · The limb may be injured without you noticing because it is  "numb  · Keep the limb elevated whenever possible  · Do not sleep on the limb  · Change the position of the limb regularly  · Avoid putting pressure on your surgical limb  Pain Control  · The initial block on the day of surgery will make your extremity feel \"numb\"  · Any consecutive injection including prior to discharge from the hospital will make your extremity feel \"numb\"  · You may feel an aching or burning when the local anesthesia starts to wear off  · Take pain pills as prescribed by your surgeon  · Call your surgeon or anesthesiologist if you do not have adequate pain control  "

## 2018-12-28 NOTE — OR SURGEON
Post OP Note    PreOp Diagnosis:   Displaced right fourth and fifth metatarsal fractures    PostOp Diagnosis:   Same    Procedure(s):  ORIF Right 4th and 5th Metatarsal fractures    Surgeon(s):  Wm Richie Amado M.D.    Anesthesiologist/Type of Anesthesia:  Anesthesiologist: Farhad Pierce M.D./General    Surgical Staff:  Circulator: Cameron Moore R.N.  Scrub Person: Parag Jimenez  Radiology Technologist: Harinder Galeas    Specimens removed if any:  * No specimens in log *    [place]    The patient and her  were seen in the preop holding area where she is reevaluated and Skin [facility].  There are no substantial changes in her exam and her H&P was signed off.  We discussed the surgery and the operative procedure and the use of hardware another factors.  Informed consent was obtained. She received a sciatic level block for postoperative pain management from Dr. Pierce    Once in the operating suite under general anesthesia she was positioned in a near full lateral position.  A thigh tourniquet was placed and she was prepped and draped.  The leg was exsanguinated thigh tourniquet inflated and prior to the onset of procedure timeout performed including confirmation of IV antibiotics within an hour surgical cut time.    Loupe magnification was used through the procedure.  Fluoroscopy guidance was used to plan the incision which was placed between the fourth and fifth metatarsals.  Full-thickness skin flaps were developed in the C-shaped incision and the fourth metatarsal was addressed first.  This was a comminuted fracture with rotation and angulation and an anatomic reduction was obtained.  Interfragmentary screw fixation was not indicated due to the combination.  I chose a 6-hole plate leaving the middle 2 holes empty due to the comminution.  Anatomic alignment and position were obtained    The fifth metatarsal was then addressed with reduction of the shortened and displaced fracture.  An interfragmentary screw  was placed and then a 4-hole plate with excellent purchase    Wounds were irrigated and closed with deep Vicryl followed by subjective Vicryl and subcuticular Monocryl and a few staples.    A well-padded dressing was applied followed by application of the patient's fracture boot.  She is instructed in nonweightbearing status and I will see her in office in approximately a week and a half, sooner if problems arise          12/27/2018 4:58 PM Wm Richie Amado M.D.

## 2019-01-29 DIAGNOSIS — H40.9 GLAUCOMA, UNSPECIFIED GLAUCOMA TYPE, UNSPECIFIED LATERALITY: ICD-10-CM

## 2019-01-31 ENCOUNTER — NON-PROVIDER VISIT (OUTPATIENT)
Dept: MEDICAL GROUP | Facility: MEDICAL CENTER | Age: 64
End: 2019-01-31
Payer: COMMERCIAL

## 2019-01-31 DIAGNOSIS — Z23 NEED FOR VACCINATION: ICD-10-CM

## 2019-01-31 PROCEDURE — 90746 HEPB VACCINE 3 DOSE ADULT IM: CPT | Performed by: INTERNAL MEDICINE

## 2019-01-31 PROCEDURE — 90471 IMMUNIZATION ADMIN: CPT | Performed by: INTERNAL MEDICINE

## 2019-01-31 NOTE — PROGRESS NOTES
"Samantha Yu is a 63 y.o. female here for a non-provider visit for:   HEPATITIS B 2 of 3    Reason for immunization: Traveling  Immunization records indicate need for vaccine: Yes, confirmed with Epic  Minimum interval has been met for this vaccine: Yes  ABN completed: Not Indicated    Order and dose verified by: SAUNDRA  VIS Dated  07/20/16 was given to patient: Yes  All IAC Questionnaire questions were answered \"No.\"    Patient tolerated injection and no adverse effects were observed or reported: Yes    Pt scheduled for next dose in series: No    "

## 2019-02-06 ENCOUNTER — NON-PROVIDER VISIT (OUTPATIENT)
Dept: OCCUPATIONAL MEDICINE | Facility: CLINIC | Age: 64
End: 2019-02-06

## 2019-02-06 VITALS — HEART RATE: 98 BPM | OXYGEN SATURATION: 93 % | WEIGHT: 200.8 LBS | TEMPERATURE: 97.9 F | BODY MASS INDEX: 32.41 KG/M2

## 2019-02-06 DIAGNOSIS — Z71.84 TRAVEL ADVICE ENCOUNTER: ICD-10-CM

## 2019-02-06 DIAGNOSIS — Z23 ENCOUNTER FOR IMMUNIZATION: ICD-10-CM

## 2019-02-06 PROCEDURE — 90471 IMMUNIZATION ADMIN: CPT | Performed by: PREVENTIVE MEDICINE

## 2019-02-06 PROCEDURE — 90691 TYPHOID VACCINE IM: CPT | Performed by: PREVENTIVE MEDICINE

## 2019-02-07 NOTE — PROGRESS NOTES
Travel dates: 3/18/19-3/23/19  Countries to be visited: [facility]  Reason for travel:  Pleasure-vacation. Pt will be traveling with her  inland and to the [facility].     Rural travel: yes- [facility]  High altitude travel: not sure    Accommodations:  Hotel: yes   Hostel:  Camping:  Cruise:  With family:  Other:    Vaccines in past 30 days? Yes- Hep B  Sick today? Nono  Allergies: None stated, sulfa on record     The screening intake form was reviewed with the traveler. Health risks associated with their travel plans have been reviewed and discussed with the traveler. The traveler has been provided with vaccine information statements for the vaccines that are recommended and given the opportunity to discuss risks and benefits of vaccination and or medications. The traveler has received education on the travel itinerary provided and on the following topics.    Personal safety precautions: Yes  Food and water precautions: Yes  Management of traveler's diarrhea: Yes  Mosquito/insect bite prevention:Yes  Animal bites/Rabies prevention: No  High altitude precautions: No      RN comments:     Typhoid vaccine administered, vis given.    Travelers diarrhea self tx recommended. Patient aware of risks and benefits but declines medication at this time.           Physician consultation required: no

## 2019-03-26 ENCOUNTER — NON-PROVIDER VISIT (OUTPATIENT)
Dept: WOUND CARE | Facility: MEDICAL CENTER | Age: 64
End: 2019-03-26
Attending: ORTHOPAEDIC SURGERY
Payer: COMMERCIAL

## 2019-03-26 PROCEDURE — 97597 DBRDMT OPN WND 1ST 20 CM/<: CPT

## 2019-03-26 NOTE — CERTIFICATION
Advanced Wound Care  [facility]  [address]  [address]  [place] [address]  [phone] Fax: [phone]      Initial Evaluation  For Certification Period:3/26/2019 - [id number]      Referring Physician: Dr. Mark Amado  Primary Physician:    MANUEL Gillespie    Consulting Physicians:         Wound(s):Rt foot 4th/5th MTH   Start of Care: 3/26/2019       Subjective:        HPI:   62 yo female living with  and son. Before Vincenzo, pt fell down the stairs when carrying presents, broke 4th/5th MTHs. She had surgery early January with Dr. Amado, ORIF with two plates. 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 Seemed that things were going well, although ball of foot still hurting. Went on vacation, wore boot, but also sandals. Before vacation, scab came off, used peroxide on it. Has been treating it now with peroxide and Neosporin, she states the wound seems to be getting worse. Will see  Dr. Amado this Friday.             Pain:     Tender, occasionally  Tingles.       Past Medical History:  Past Medical History:   Diagnosis Date   • Cataract 12/2018    Bilateral - early stages, no surgery   • Fatty liver    • Glaucoma     Bilateral   • Heart burn    • Hemorrhagic disorder (HCC) 12/2018    AML leukemia, dx 2001   • High cholesterol     No meds   • History of anemia 12/2018    Not an issue now   • Murmur    • Seasonal allergies    • Urinary incontinence     With coughing, sneezing   Current Medications:  Current Outpatient Prescriptions:   •  naproxen (NAPROSYN) 250 MG Tab, Take 250 mg by mouth as needed., Disp: , Rfl:   •  travoprost (TRAVATAN Z) 0.004 % Solution, Place 1 Drop in both eyes every day., Disp: , Rfl:   •  oxyCODONE-acetaminophen (PERCOCET) 5-325 MG Tab, Take 1-2 Tabs by mouth every four hours as needed., Disp: , Rfl:   •  tramadol (ULTRAM) 50 MG Tab, Take 50 mg by mouth as needed., Disp: , Rfl:   Allergies: Sulfa drugs  Past Surgical  History:   Past Surgical History:   Procedure Laterality Date   • FOOT ORIF Right 12/27/2018    Procedure: FOOT ORIF-  METATARSAL;  Surgeon: Wm Richie Amado M.D.;  Location: SURGERY [facility];  Service: Orthopedics   • EYE SURGERY Bilateral 2015    d/t pressure in eye from glaucoma   • OTHER  05/2002    Bone marrow transplant   • TUBAL LIGATION  1996     Social History:    Social History     Social History   • Marital status:      Spouse name: N/A   • Number of children: N/A   • Years of education: N/A     Occupational History   • Not on file.     Social History Main Topics   • Smoking status: Never Smoker   • Smokeless tobacco: Never Used   • Alcohol use 0.6 oz/week     1 Glasses of wine per week      Comment: 2 per month   • Drug use: No   • Sexual activity: Yes     Partners: Male     Other Topics Concern   • Not on file     Social History Narrative   • No narrative on file         Objective:      Tests and Measures:no s/s infection, no culture needed    Orthotic, protective, supportive devices: post-op shoe    Fall Risk Assessment (lyn all that apply with an X): not a fall risk              65 years or older                Fall within the last 2 years, uses   Ambulatory devices   Loss of protective sensation in feet,    Use of prostethic/orthotic, years               Presence of lower extremity/foot/toe amputationx              Taking medication that increases risk (per facility policy)                                                 Incision 12/27/18 Foot (Active)       Incision 03/26/19 Foot (Active)   Wound Image    3/26/2019 10:00 AM   Site Assessment Yellow 3/26/2019 10:00 AM   Monika-wound Assessment Edema;Excoriated;Pink 3/26/2019 10:00 AM   Post Wound Length (cm) 3.5 cm 3/26/2019 10:00 AM    Post Wound Width (cm) 1.3 cm 3/26/2019 10:00 AM   Post Wound Depth (cm) 0.3 cm 3/26/2019 10:00 AM   Post Wound Surface Area (cm^2) 4.55 cm^2 3/26/2019 10:00 AM   Tunneling 0 cm 3/26/2019 10:00 AM      Undermining 0 cm 3/26/2019 10:00 AM   Closure Secondary intention 3/26/2019 10:00 AM   Drainage Amount Moderate 3/26/2019 10:00 AM   Drainage Description Serosanguineous 3/26/2019 10:00 AM   Treatments Cleansed;Direct pressure 3/26/2019 10:00 AM   Periwound Protectant Moisture Barrier;Skin Protectant wipes to Periwound 3/26/2019 10:00 AM   Dressing Options Adhesive Foam;Compression Wrap Two Layer;Honey Alginate;Other (Comments) 3/26/2019 10:00 AM   Dressing Changed New 3/26/2019 10:00 AM   Dressing Status Clean;Dry;Intact 3/26/2019 10:00 AM   Dressing Change Frequency Every 72 hrs 3/26/2019 10:00 AM   NEXT Dressing Change  03/29/19 3/26/2019 10:00 AM   WOUND NURSE ONLY - Odor None 3/26/2019 10:00 AM   WOUND NURSE ONLY - Pulses 2+;DP;PT 3/26/2019 10:00 AM   WOUND NURSE ONLY - Exposed Structures None 3/26/2019 10:00 AM                         Patient Education: Instructed pt on s/s infection - chills, fever, malaise, NV, increased redness/swelling/pain/exudate - and to go to ER/Urgent Care; rationale for dressing choice and compress; if compression feels too tight to take off by unwrapping, not cutting; to elevate as much as possible; to return Friday after appointment with surgeon for rewrapping; rationale for not using peroxide on healing wound; pt verbalized understanding.     Professional Collaboration: certification sent to referring provider via EPIC      Assessment:      Wound etiology: surgical    Wound Progress:  Initial eval    Rationale for Treatment:Medihoney alginate as an antimicrobial, to provide a moist wound environment, absorb drainage and facilitate autolytic debridement. Compression to facilitate movement of fluid and speed wound healing.     Patient tolerance/compliance: pt very interested in all teaching.     Complicating factors:non healing wound    Need for ongoing Advanced Wound Care services:Patient requires skilled therapeutic intervention for debridement, product selection and  application, education, wound bed preparation and assessment.      Plan:      Treatment Plan and Recommendations:  Diagnosis/ICD9: S42.341A    Procedures/CPT:59671    Frequency: 2x/week      Treatment Goals: STG 2 Weeks  LTG 4 Weeks   Granulation Tissue: 20% 40%   Decrease Necrotic Tissue to: 80% 100%   Wound Phase:  proliferation proliferation   Decrease Size by: 10% 20%   Periwound:  intact intact   Decrease tracts/undermining by: na na   Decrease Pain:  na na       At the time of each visit a thorough assessment of the patient is completed to assure the  appropriateness of our plan of care.  The dressings or modalities may need to be adapted   from the original plan to address any significant changes in the wound environment.          Clinician Signature:_______________________________Date__________________      Physician Signature:______________________________Date:__________________

## 2019-03-26 NOTE — PROCEDURES
Pulses brisk, multiphasic per doppler, easily palpable 2+  CSWD with curette to remove approx 4.5 cm2 of yellow loose/adherent tissue. Pt tolerated procedure well without lido.  Pt has appt with Dr. Amado on Friday at 8:30. He will most likely remove the wrap. Pt has [facility] appt at 4 PM Friday for rewrapping.

## 2019-03-26 NOTE — PATIENT INSTRUCTIONS
Pt refused AVS.   Avoid prolonged standing or sitting without elevating your legs.  Elevate as much as possible.   - Multilayer compression wrap to rt leg. Do not get wet and keep on for the week. Only remove if temperature or sensation changes.   If compression needs to be removed, un-wrap it do not cut it off.     Should you experience any significant changes in your wound(s), such as infection (redness, swelling, localized heat, increased pain, fever > 101 F, chills) or have any questions regarding your home care instructions, please contact the wound center at [phone]. If after hours, contact your primary care physician or go to the hospital emergency room.   Keep dressing clean, dry and covered while bathing. Only change dressing if it becomes over saturated, soiled or falls off.

## 2019-03-29 ENCOUNTER — NON-PROVIDER VISIT (OUTPATIENT)
Dept: WOUND CARE | Facility: MEDICAL CENTER | Age: 64
End: 2019-03-29
Attending: ORTHOPAEDIC SURGERY
Payer: COMMERCIAL

## 2019-03-29 PROCEDURE — 97602 WOUND(S) CARE NON-SELECTIVE: CPT

## 2019-03-29 NOTE — PATIENT INSTRUCTIONS
Avoid prolonged standing or sitting without elevating your legs.    - Multilayer compression wrap to right leg. Do not get wet and keep on for the week. Only remove if temperature or sensation changes.    Keep dressing clean and dry and cover while bathing. Only change dressing if over saturated, soiled or its falling off.     Should you experience any significant changes in your wound(s) such as infection (redness, swelling, localized heat, increased pain, fever >101 F, chills) or have any questions regarding your home care instructions, please contact the wound center [phone]. If after hours, contact your primary care physician or go the hospital emergency room.

## 2019-04-02 ENCOUNTER — NON-PROVIDER VISIT (OUTPATIENT)
Dept: WOUND CARE | Facility: MEDICAL CENTER | Age: 64
End: 2019-04-02
Attending: ORTHOPAEDIC SURGERY
Payer: COMMERCIAL

## 2019-04-02 PROCEDURE — 97597 DBRDMT OPN WND 1ST 20 CM/<: CPT

## 2019-04-03 NOTE — PATIENT INSTRUCTIONS
Discussed POC, wound care rationale, dressing selection and to return to [facility] twice weekly for appts. Pt instructed to keep dressing CDI and to return to ER for any s/s infection, n/v, fever or chills. Pt verbalized understanding to all.    Ok to take tubigrip off at night and put on in the morning.

## 2019-04-03 NOTE — PROCEDURES
cswd using curette to remove ~3cm2 slough from wound bed.  2% topical lidocaine applied prior to debridement with ~5 minute dwell time.  Pt tolerated well.

## 2019-04-05 ENCOUNTER — NON-PROVIDER VISIT (OUTPATIENT)
Dept: WOUND CARE | Facility: MEDICAL CENTER | Age: 64
End: 2019-04-05
Attending: ORTHOPAEDIC SURGERY
Payer: COMMERCIAL

## 2019-04-05 PROCEDURE — 97597 DBRDMT OPN WND 1ST 20 CM/<: CPT

## 2019-04-09 ENCOUNTER — NON-PROVIDER VISIT (OUTPATIENT)
Dept: WOUND CARE | Facility: MEDICAL CENTER | Age: 64
End: 2019-04-09
Attending: ORTHOPAEDIC SURGERY
Payer: COMMERCIAL

## 2019-04-09 PROCEDURE — 97597 DBRDMT OPN WND 1ST 20 CM/<: CPT

## 2019-04-09 NOTE — PROCEDURES
CSWD with Curette to remove approx 1 cm2 of adherent yellow tissue. Pt tolerated procedure without lidocaine.

## 2019-04-09 NOTE — PATIENT INSTRUCTIONS
Avoid prolonged standing or sitting without elevating your legs.  - Apply tubigrip to your legs ending 2 fingers below back of knee without wrinkles.   s  If compression needs to be removed, un-wrap it do not cut it off.     Should you experience any significant changes in your wound(s), such as infection (redness, swelling, localized heat, increased pain, fever > 101 F, chills) or have any questions regarding your home care instructions, please contact the wound center at [phone]. If after hours, contact your primary care physician or go to the hospital emergency room.   Keep dressing clean, dry and covered while bathing. Only change dressing if it becomes over saturated, soiled or falls off.

## 2019-04-12 ENCOUNTER — NON-PROVIDER VISIT (OUTPATIENT)
Dept: WOUND CARE | Facility: MEDICAL CENTER | Age: 64
End: 2019-04-12
Attending: ORTHOPAEDIC SURGERY
Payer: COMMERCIAL

## 2019-04-12 PROCEDURE — 97597 DBRDMT OPN WND 1ST 20 CM/<: CPT

## 2019-04-12 NOTE — PATIENT INSTRUCTIONS
Keep dressing clean and dry and cover while bathing. Only change dressing if over saturated, soiled or its falling off.     Should you experience any significant changes in your wound(s) such as infection (redness, swelling, localized heat, increased pain, fever >101 F, chills) or have any questions regarding your home care instructions, please contact the wound center [phone]. If after hours, contact your primary care physician or go the hospital emergency room.

## 2019-04-16 ENCOUNTER — NON-PROVIDER VISIT (OUTPATIENT)
Dept: WOUND CARE | Facility: MEDICAL CENTER | Age: 64
End: 2019-04-16
Attending: ORTHOPAEDIC SURGERY
Payer: COMMERCIAL

## 2019-04-16 ENCOUNTER — HOSPITAL ENCOUNTER (OUTPATIENT)
Facility: MEDICAL CENTER | Age: 64
End: 2019-04-16
Attending: NURSE PRACTITIONER
Payer: COMMERCIAL

## 2019-04-16 DIAGNOSIS — T14.8XXA WOUND INFECTION: ICD-10-CM

## 2019-04-16 DIAGNOSIS — T14.8XXA WOUND INFECTION: Primary | ICD-10-CM

## 2019-04-16 DIAGNOSIS — L08.9 WOUND INFECTION: Primary | ICD-10-CM

## 2019-04-16 DIAGNOSIS — L08.9 WOUND INFECTION: ICD-10-CM

## 2019-04-16 PROCEDURE — 87186 SC STD MICRODIL/AGAR DIL: CPT

## 2019-04-16 PROCEDURE — 97597 DBRDMT OPN WND 1ST 20 CM/<: CPT

## 2019-04-16 PROCEDURE — 87205 SMEAR GRAM STAIN: CPT

## 2019-04-16 PROCEDURE — 87070 CULTURE OTHR SPECIMN AEROBIC: CPT

## 2019-04-16 PROCEDURE — 87077 CULTURE AEROBIC IDENTIFY: CPT

## 2019-04-16 NOTE — PROCEDURES
Pt c/o increased pain to wound bed, erythema surrounding wound approx 1 cm out, increased edema. Pt denies chills, fever, N/V. Culture done.   Lido gel 2% applied, dwell time 10 min. CSWD with curette to remove <1 cm2 of necrotic yellow tissue. Pt tolerated procedure well.    independent

## 2019-04-16 NOTE — PATIENT INSTRUCTIONS
Avoid prolonged standing or sitting without elevating your legs.  - Apply tubigrip to your legs ending 2 fingers below back of knee without wrinkles.     If compression needs to be removed, un-wrap it do not cut it off.     Should you experience any significant changes in your wound(s), such as infection (redness, swelling, localized heat, increased pain, fever > 101 F, chills) or have any questions regarding your home care instructions, please contact the wound center at [phone]. If after hours, contact your primary care physician or go to the hospital emergency room.   Keep dressing clean, dry and covered while bathing. Only change dressing if it becomes over saturated, soiled or falls off.

## 2019-04-17 LAB
GRAM STN SPEC: NORMAL
SIGNIFICANT IND 70042: NORMAL
SITE SITE: NORMAL
SOURCE SOURCE: NORMAL

## 2019-04-19 ENCOUNTER — DOCUMENTATION (OUTPATIENT)
Dept: WOUND CARE | Facility: MEDICAL CENTER | Age: 64
End: 2019-04-19

## 2019-04-19 ENCOUNTER — NON-PROVIDER VISIT (OUTPATIENT)
Dept: WOUND CARE | Facility: MEDICAL CENTER | Age: 64
End: 2019-04-19
Attending: ORTHOPAEDIC SURGERY
Payer: COMMERCIAL

## 2019-04-19 LAB
BACTERIA WND AEROBE CULT: ABNORMAL
BACTERIA WND AEROBE CULT: ABNORMAL
GRAM STN SPEC: ABNORMAL
SIGNIFICANT IND 70042: ABNORMAL
SITE SITE: ABNORMAL
SOURCE SOURCE: ABNORMAL

## 2019-04-19 PROCEDURE — 97597 DBRDMT OPN WND 1ST 20 CM/<: CPT

## 2019-04-19 NOTE — NON-PROVIDER
Discussed positive wound culture result with Becki JACKSON. Wound assessment improved at today's visit with decreased drainage and odor. ABX not prescribed at this time, however to be reassessed at next visit. Pt called and updated, she verbalized understanding and agrees with POC.

## 2019-04-19 NOTE — PROCEDURES
2% Viscous lidocaine applied to wound and periwound ~5 minutes dwell time.   CSWD using curette to remove approx. ~3cm2 of nonviable tissue from wound bed.

## 2019-04-19 NOTE — PROGRESS NOTES
Wound cx positive for PSAR to R dorsal foot.  Seen by Ti Mckeon RN today. Reports drainage has decreased, periwound improving. Edema remains.   Pt dressing was switched from honey to aquacel ag at last visit. Improvement with drainage noted with dressing change.   Continue with aquacel ag and tubigrip double layer.   Consider increasing dressing frequency to daily if drainage continues.    Reviewed photo, labs, EKG.   Next visit 4/23/19. Wound to be reassessed and need for abx at this time. Notified colleague Samantha Luke RN to contact pt and update on POC.

## 2019-04-19 NOTE — PATIENT INSTRUCTIONS
Keep dressing clean and dry and cover while bathing. Only change dressing if over saturated, soiled or its falling off.     Should you experience any significant changes in your wound(s) such as infection (redness, swelling, localized heat, increased pain, fever >101 F, chills) or have any questions regarding your home care instructions, please contact the wound center [phone]. If after hours, contact your primary care physician or go the hospital emergency room.

## 2019-04-23 ENCOUNTER — NON-PROVIDER VISIT (OUTPATIENT)
Dept: WOUND CARE | Facility: MEDICAL CENTER | Age: 64
End: 2019-04-23
Attending: ORTHOPAEDIC SURGERY
Payer: COMMERCIAL

## 2019-04-23 PROCEDURE — 97597 DBRDMT OPN WND 1ST 20 CM/<: CPT

## 2019-04-23 NOTE — PROCEDURES
Lido 2% gel to wound bed, dwell time 10 min.   CSWD with curette to remove approx 2 cm2 of loose necrotic tissue and wound bed biofilm.   Pt states that wound pain is much better, stings slightly at this time RT shoe rubbing; no odor; no erythema, no N/V, chills/fever to indicate infection.

## 2019-04-23 NOTE — PATIENT INSTRUCTIONS
Avoid prolonged standing or sitting without elevating your legs.  - Apply tubigrip to your legs ending 2 fingers below back of knee without wrinkles.     If compression needs to be removed, un-wrap it do not cut it off.     Should you experience any significant changes in your wound(s), such as infection (redness, swelling, localized heat, increased pain, fever > 101 F, chills) or have any questions regarding your home care instructions, please contact the wound center at [phone]. If after hours, contact your primary care physician or go to the hospital emergency room.   Keep dressing clean, dry and covered while bathing. Only change dressing if it becomes over saturated, soiled or falls off.

## 2019-04-26 ENCOUNTER — NON-PROVIDER VISIT (OUTPATIENT)
Dept: WOUND CARE | Facility: MEDICAL CENTER | Age: 64
End: 2019-04-26
Attending: ORTHOPAEDIC SURGERY
Payer: COMMERCIAL

## 2019-04-26 PROCEDURE — 97597 DBRDMT OPN WND 1ST 20 CM/<: CPT

## 2019-04-26 NOTE — PATIENT INSTRUCTIONS
-Keep dressings clean, dry and covered while bathing. Only change dressings if they become over saturated, soiled or fall off.     -Avoid prolonged standing or sitting without elevating your legs.    -Remove your compression garments if you have severe pain, severe swelling, numbness, color change, or temperature change in your toes.     -Should you experience any significant changes in your wound(s), such as infection (redness, swelling, localized heat, increased pain, fever > 101 F, chills) or have any questions regarding your home care instructions, please contact the wound center at [phone]. If after hours, contact your primary care physician or go to the hospital emergency room.

## 2019-04-26 NOTE — WOUND TEAM
Wound care supply order faxed to [facility] 04/26/2019       Incision 03/26/19 Foot (Active)   Wound Image   4/19/2019  9:30 AM   Site Assessment Red;Yellow 4/26/2019  9:50 AM   Monika-wound Assessment Edema 4/26/2019  9:50 AM   Margins Epibole (rolled edges) 4/26/2019  9:50 AM   Wound Length (cm) 2.5 cm 4/23/2019  4:30 PM   Wound Width (cm) 0.8 cm 4/23/2019  4:30 PM   Wound Depth (cm) 0.2 cm 4/23/2019  4:30 PM   Wound Surface Area (cm^2) 2 cm^2 4/23/2019  4:30 PM   Post Wound Length (cm) 2.5 cm 4/23/2019  4:30 PM    Post Wound Width (cm) 0.5 cm 4/23/2019  4:30 PM   Post Wound Depth (cm) 0.2 cm 4/23/2019  4:30 PM   Post Wound Surface Area (cm^2) 1.25 cm^2 4/23/2019  4:30 PM   Tunneling 0 cm 4/2/2019  5:00 PM   Undermining 0 cm 4/2/2019  5:00 PM   Closure Secondary intention 4/26/2019  9:50 AM   Drainage Amount Moderate 4/26/2019  9:50 AM   Drainage Description Serosanguineous 4/26/2019  9:50 AM   Treatments Cleansed;Other (Comment) 4/26/2019  9:50 AM   Periwound Protectant Moisture Barrier 4/26/2019  9:50 AM   Dressing Options Hydrofiber Silver;Nonadhesive Foam;Hypafix Tape;Tubigrip 4/26/2019  9:50 AM   Dressing Changed Changed 4/26/2019  9:50 AM   Dressing Status Clean;Dry;Intact 4/26/2019  9:50 AM   Dressing Change Frequency Every 72 hrs 4/16/2019  4:30 PM   NEXT Dressing Change  03/29/19 3/26/2019 10:00 AM   WOUND NURSE ONLY - Odor None 4/26/2019  9:50 AM   WOUND NURSE ONLY - Pulses Right;DP;2+ 4/26/2019  9:50 AM   WOUND NURSE ONLY - Exposed Structures None 4/26/2019  9:50 AM   WOUND NURSE ONLY - Tissue Type and Percentage 95% red, 5% yellow post debridement 4/26/2019  9:50 AM

## 2019-04-26 NOTE — PROCEDURES
2% viscous Lidocaine gel dwell time ~5 minutes prior to debridement  CSWD with scalpel to remove ~4 cm2 of non viable tissue from wound bed and anjum wound.

## 2019-05-02 ENCOUNTER — NON-PROVIDER VISIT (OUTPATIENT)
Dept: WOUND CARE | Facility: MEDICAL CENTER | Age: 64
End: 2019-05-02
Attending: ORTHOPAEDIC SURGERY
Payer: COMMERCIAL

## 2019-05-02 PROCEDURE — 97597 DBRDMT OPN WND 1ST 20 CM/<: CPT

## 2019-05-03 NOTE — PATIENT INSTRUCTIONS
Should you experience any significant changes in your wound(s) such as infection (redness, swelling, localized heat, increased pain, fever >101 F, chills) or have any questions regarding your home care instructions, please contact the wound center [phone]. If after hours, contact your primary care physician or go the hospital emergency room.  Keep dressing clean and dry and cover while bathing. Only change dressing if over saturated, soiled or its falling off.

## 2019-05-10 ENCOUNTER — NON-PROVIDER VISIT (OUTPATIENT)
Dept: WOUND CARE | Facility: MEDICAL CENTER | Age: 64
End: 2019-05-10
Attending: ORTHOPAEDIC SURGERY
Payer: COMMERCIAL

## 2019-05-10 PROCEDURE — 97597 DBRDMT OPN WND 1ST 20 CM/<: CPT

## 2019-05-17 ENCOUNTER — NON-PROVIDER VISIT (OUTPATIENT)
Dept: WOUND CARE | Facility: MEDICAL CENTER | Age: 64
End: 2019-05-17
Attending: ORTHOPAEDIC SURGERY
Payer: COMMERCIAL

## 2019-05-17 PROCEDURE — 97597 DBRDMT OPN WND 1ST 20 CM/<: CPT

## 2019-05-18 NOTE — PROCEDURES
CSWD after application of topical lidocaine to left foot dorsum of ~ 3cm2 of dried yellow non-viable crust over wound bed with forceps & scalpel; when proximal portion palpated, small creamy white drainage expressed from depth.

## 2019-05-18 NOTE — PATIENT INSTRUCTIONS
Should you experience any significant changes in your wound(s) such as infection (redness, swelling, localized heat, increased pain, fever >101 F, chills) or have any questions regarding your home care instructions, please contact the wound center [phone]. If after hours, contact your primary care physician or go the hospital emergency room.  Keep dressing clean and dry and cover while bathing. Only change dressing if over saturated, soiled or its falling off or every 72 hours.

## 2019-05-24 ENCOUNTER — NON-PROVIDER VISIT (OUTPATIENT)
Dept: WOUND CARE | Facility: MEDICAL CENTER | Age: 64
End: 2019-05-24
Attending: ORTHOPAEDIC SURGERY
Payer: COMMERCIAL

## 2019-05-24 PROCEDURE — 97597 DBRDMT OPN WND 1ST 20 CM/<: CPT

## 2019-05-24 NOTE — PROCEDURES
CSWD using forceps to remove ~0.5cm2 of loose nonviable crust and biofilm.  Pt tolerated well.  No s/s of complications noted.

## 2019-05-31 ENCOUNTER — APPOINTMENT (OUTPATIENT)
Dept: WOUND CARE | Facility: MEDICAL CENTER | Age: 64
End: 2019-05-31
Attending: ORTHOPAEDIC SURGERY
Payer: COMMERCIAL

## 2019-06-07 ENCOUNTER — NON-PROVIDER VISIT (OUTPATIENT)
Dept: WOUND CARE | Facility: MEDICAL CENTER | Age: 64
End: 2019-06-07
Attending: ORTHOPAEDIC SURGERY
Payer: COMMERCIAL

## 2019-06-07 PROCEDURE — 97597 DBRDMT OPN WND 1ST 20 CM/<: CPT

## 2019-06-07 NOTE — PATIENT INSTRUCTIONS
Keep dressing clean and dry and cover while bathing. Only change dressing if over saturated, soiled or its falling off.     Should you experience any significant changes in your wound(s) such as infection (redness, swelling, localized heat, increased pain, fever >101 F, chills) or have any questions regarding your home care instructions, please contact the wound center [phone]. If after hours, contact your primary care physician or go the hospital emergency room.

## 2019-06-14 ENCOUNTER — APPOINTMENT (OUTPATIENT)
Dept: WOUND CARE | Facility: MEDICAL CENTER | Age: 64
End: 2019-06-14
Attending: ORTHOPAEDIC SURGERY
Payer: COMMERCIAL

## 2019-06-21 ENCOUNTER — OFFICE VISIT (OUTPATIENT)
Dept: MEDICAL GROUP | Facility: MEDICAL CENTER | Age: 64
End: 2019-06-21
Payer: COMMERCIAL

## 2019-06-21 ENCOUNTER — NON-PROVIDER VISIT (OUTPATIENT)
Dept: WOUND CARE | Facility: MEDICAL CENTER | Age: 64
End: 2019-06-21
Attending: ORTHOPAEDIC SURGERY
Payer: COMMERCIAL

## 2019-06-21 VITALS
OXYGEN SATURATION: 96 % | HEIGHT: 66 IN | HEART RATE: 80 BPM | WEIGHT: 207 LBS | BODY MASS INDEX: 33.27 KG/M2 | SYSTOLIC BLOOD PRESSURE: 112 MMHG | DIASTOLIC BLOOD PRESSURE: 70 MMHG | TEMPERATURE: 98.4 F | RESPIRATION RATE: 16 BRPM

## 2019-06-21 DIAGNOSIS — Z23 NEED FOR VACCINATION: ICD-10-CM

## 2019-06-21 DIAGNOSIS — E66.9 OBESITY (BMI 30-39.9): ICD-10-CM

## 2019-06-21 DIAGNOSIS — R12 HEART BURN: ICD-10-CM

## 2019-06-21 DIAGNOSIS — Z00.00 ROUTINE GENERAL MEDICAL EXAMINATION AT HEALTH CARE FACILITY: ICD-10-CM

## 2019-06-21 DIAGNOSIS — E78.5 DYSLIPIDEMIA: ICD-10-CM

## 2019-06-21 DIAGNOSIS — Z11.59 ENCOUNTER FOR HEPATITIS C SCREENING TEST FOR LOW RISK PATIENT: ICD-10-CM

## 2019-06-21 DIAGNOSIS — Z12.11 SCREENING FOR COLORECTAL CANCER: ICD-10-CM

## 2019-06-21 DIAGNOSIS — M25.571 RIGHT ANKLE PAIN, UNSPECIFIED CHRONICITY: ICD-10-CM

## 2019-06-21 DIAGNOSIS — Z12.12 SCREENING FOR COLORECTAL CANCER: ICD-10-CM

## 2019-06-21 PROBLEM — H40.89 OTHER SPECIFIED GLAUCOMA: Status: ACTIVE | Noted: 2019-06-21

## 2019-06-21 PROBLEM — E78.2 MIXED HYPERLIPIDEMIA: Status: RESOLVED | Noted: 2017-10-31 | Resolved: 2019-06-21

## 2019-06-21 PROBLEM — Z98.1 HISTORY OF ANKLE FUSION: Status: ACTIVE | Noted: 2019-06-21

## 2019-06-21 PROBLEM — Z85.6: Chronic | Status: ACTIVE | Noted: 2017-10-31

## 2019-06-21 PROCEDURE — 90472 IMMUNIZATION ADMIN EACH ADD: CPT | Performed by: NURSE PRACTITIONER

## 2019-06-21 PROCEDURE — 90632 HEPA VACCINE ADULT IM: CPT | Performed by: NURSE PRACTITIONER

## 2019-06-21 PROCEDURE — 99213 OFFICE O/P EST LOW 20 MIN: CPT | Mod: 25 | Performed by: NURSE PRACTITIONER

## 2019-06-21 PROCEDURE — 90471 IMMUNIZATION ADMIN: CPT | Performed by: NURSE PRACTITIONER

## 2019-06-21 PROCEDURE — 99211 OFF/OP EST MAY X REQ PHY/QHP: CPT

## 2019-06-21 PROCEDURE — 90746 HEPB VACCINE 3 DOSE ADULT IM: CPT | Performed by: NURSE PRACTITIONER

## 2019-06-21 RX ORDER — BRINZOLAMIDE 10 MG/ML
1 SUSPENSION/ DROPS OPHTHALMIC 2 TIMES DAILY
Refills: 3 | COMMUNITY
Start: 2019-03-27 | End: 2021-09-07

## 2019-06-21 ASSESSMENT — PATIENT HEALTH QUESTIONNAIRE - PHQ9: CLINICAL INTERPRETATION OF PHQ2 SCORE: 0

## 2019-06-21 NOTE — CERTIFICATION
[facility]   [facility]   [address]   [address]   [place] [address]   [phone] Fax: [phone]     Discharge Note        Referring Physician:  Wm Richie Amado M.D.  Wound Etiology: Surgical Wound  Wound location: Right Dorsal Foot  ICD-9: S92.341A (ICD-10-CM) - Displaced fracture of fourth metatarsal bone, right foot, initial encounter for closed fracture  Date of Discharge: 06/21/2019     Assessment:  Discharge patient at this time secondary to wound resolution. Educated pt on maturation process of wound healing and how it will take several months up to more than a year to fully heal, which will be about 80% tensile strength of what it was. Instructed pt to continue gentle care, protect the wound site, avoid sunlight, apply sun block if exposed to sun, and apply moisturizer daily. Pt verbalized understanding to all.    Thank you for the referral and the opportunity to treat your patient.

## 2019-06-21 NOTE — PATIENT INSTRUCTIONS
Wound site will take several months up to more than a year to fully heal, which will be about 80% tensile strength of what it was. Please continue gentle care, protect the wound site, avoid sunlight, apply sun block if exposed to sun, and apply moisturizer daily.

## 2019-06-21 NOTE — PROGRESS NOTES
cc:  Right ankle stiffness/need for vaccine.      Subjective:     HPI:     Samantha Yu is a 63 y.o. female here to discuss the evaluation and management of:    Patient has not been seen in over one year.  She presents today as she is requesting her hepatitis immunizations to complete her series.  She has been traveling in the past.  She also states that she is having right foot pain.  She had a fourth and fifth metatarsal fracture in December 2018 after falling downstairs.  She had an open reduction internal fixation with 2 plates 10 pins and one screw placed.  She states that she had delayed wound healing and has just completed her treatment with the outpatient wound clinic.  Her her wound on the right dorsal aspect of her foot is healed.  She does have right foot swelling and stiffness.  She is requesting physical therapy.  She does swim several times a week and states that this can be helpful for her.  She has not been wearing any compression stockings.    Patient states that she is also been having some stress at work which she feels is causing her to have heartburn.    Patient does have a history of having elevated cholesterol.  She is not on any medications at this time.    ROS:  Denies any Headache, Blurred Vision, Confusion, Chest pain,  Shortness of breath,  Abdominal pain, Changes of bowel or bladder, Lower ext edema, Fevers, Nights sweats, Weight Changes, Focal weakness or numbness.  And all other systems are negative.right ankle stiff, heart burn        Current Outpatient Prescriptions:   •  travoprost (TRAVATAN Z) 0.004 % Solution, Place 1 Drop in both eyes every day., Disp: , Rfl:   •  AZOPT 1 % Suspension, 1 Drop by Ophthalmic route 2 Times a Day., Disp: , Rfl: 3    Allergies   Allergen Reactions   • Sulfa Drugs      hives       Past Medical History:   Diagnosis Date   • Cataract 12/2018    Bilateral - early stages, no surgery   • Fatty liver    • Glaucoma     Bilateral   • Heart burn    •  "Hemorrhagic disorder (HCC) 12/2018    AML leukemia, dx 2001   • High cholesterol     No meds   • History of anemia 12/2018    Not an issue now   • Murmur    • Seasonal allergies    • Urinary incontinence     With coughing, sneezing     Past Surgical History:   Procedure Laterality Date   • FOOT ORIF Right 12/27/2018    Procedure: FOOT ORIF-  METATARSAL;  Surgeon: Wm Richie Amado M.D.;  Location: SURGERY [facility];  Service: Orthopedics   • EYE SURGERY Bilateral 2015    d/t pressure in eye from glaucoma   • OTHER  05/2002    Bone marrow transplant   • TUBAL LIGATION  1996     Family History   Problem Relation Age of Onset   • Cancer Mother         breast   • Cancer Maternal Grandmother         breast   • Heart Attack Paternal Grandfather    • Diabetes Paternal Grandfather      Social History     Social History   • Marital status:      Spouse name: N/A   • Number of children: N/A   • Years of education: N/A     Occupational History   • Not on file.     Social History Main Topics   • Smoking status: Never Smoker   • Smokeless tobacco: Never Used   • Alcohol use 0.6 oz/week     1 Glasses of wine per week      Comment: 2 per month   • Drug use: No   • Sexual activity: Yes     Partners: Male     Other Topics Concern   • Not on file     Social History Narrative   • No narrative on file       Objective:     Vitals: /70   Pulse 80   Temp 36.9 °C (98.4 °F)   Resp 16   Ht 1.676 m (5' 6\")   Wt 93.9 kg (207 lb)   SpO2 96%   BMI 33.41 kg/m²    General: Alert, pleasant, NAD  HEENT: Normocephalic.  Neck supple.  No thyromegaly or masses palpated. No cervical or supraclavicular lymphadenopathy.  Heart: Regular rate and rhythm.  S1 and S2 normal.  No murmurs appreciated.  Respiratory: Normal respiratory effort.  Clear to auscultation bilaterally. No wheezing  Skin: Warm, dry, no rashes.right ankle light swelling, scar present on dorsal aspect of right foot.  Extremities: No leg edema. No " discoloration  Neurological: No tremors  Psych:  Affect/mood is normal, judgement is good, memory is intact, grooming is appropriate.    Assessment/Plan:     Samantha was seen today for immunizations and ankle injury.    Diagnoses and all orders for this visit:    Right ankle pain, unspecified chronicity  Encourage patient to elevate when she is home from work and wear compression stockings.  Referral placed to physical therapy.  -     REFERRAL TO PHYSICAL THERAPY Reason for Therapy: Eval/Treat/Report    Dyslipidemia  She is due for labs.  Currently not on any medications.  -     Lipid Profile; Future    Heart burn  Have discussed with patient she may use an over-the-counter antacid for this.    Obesity (BMI 30-39.9)  -     Patient identified as having weight management issue.  Appropriate orders and counseling given.    Need for vaccination  -     Hepatitis B Vaccine Adult IM  -     Hep A Adult 19+    Encounter for hepatitis C screening test for low risk patient  -     HEP C VIRUS ANTIBODY; Future    Screening for colorectal cancer  -     OCCULT BLOOD FECES IMMUNOASSAY (FIT); Future    Routine general medical examination at health care facility  Has not had routine labs in over 1-1/2 years.  -     CBC WITHOUT DIFFERENTIAL; Future  -     Comp Metabolic Panel; Future  -     TSH WITH REFLEX TO FT4; Future        No Follow-up on file.    {I have placed the above orders and discussed them with an approved delegating provider.  The MA is performing the below orders under the direction of Dr. Dusty MANNING

## 2019-07-19 DIAGNOSIS — M25.571 RIGHT ANKLE PAIN, UNSPECIFIED CHRONICITY: ICD-10-CM

## 2019-07-29 ENCOUNTER — HOSPITAL ENCOUNTER (OUTPATIENT)
Dept: LAB | Facility: MEDICAL CENTER | Age: 64
End: 2019-07-29
Attending: NURSE PRACTITIONER
Payer: COMMERCIAL

## 2019-07-29 DIAGNOSIS — Z00.00 ROUTINE GENERAL MEDICAL EXAMINATION AT HEALTH CARE FACILITY: ICD-10-CM

## 2019-07-29 DIAGNOSIS — Z11.59 ENCOUNTER FOR HEPATITIS C SCREENING TEST FOR LOW RISK PATIENT: ICD-10-CM

## 2019-07-29 DIAGNOSIS — E78.5 DYSLIPIDEMIA: ICD-10-CM

## 2019-07-29 LAB
ERYTHROCYTE [DISTWIDTH] IN BLOOD BY AUTOMATED COUNT: 57.1 FL (ref 35.9–50)
HCT VFR BLD AUTO: 46.3 % (ref 37–47)
HCV AB SER QL: NEGATIVE
HGB BLD-MCNC: 14.6 G/DL (ref 12–16)
MCH RBC QN AUTO: 31.8 PG (ref 27–33)
MCHC RBC AUTO-ENTMCNC: 31.5 G/DL (ref 33.6–35)
MCV RBC AUTO: 100.9 FL (ref 81.4–97.8)
PLATELET # BLD AUTO: 413 K/UL (ref 164–446)
PMV BLD AUTO: 9.7 FL (ref 9–12.9)
RBC # BLD AUTO: 4.59 M/UL (ref 4.2–5.4)
TSH SERPL DL<=0.005 MIU/L-ACNC: 2.32 UIU/ML (ref 0.38–5.33)
WBC # BLD AUTO: 7.3 K/UL (ref 4.8–10.8)

## 2019-07-29 PROCEDURE — 36415 COLL VENOUS BLD VENIPUNCTURE: CPT

## 2019-07-29 PROCEDURE — 84443 ASSAY THYROID STIM HORMONE: CPT

## 2019-07-29 PROCEDURE — 85027 COMPLETE CBC AUTOMATED: CPT

## 2019-07-29 PROCEDURE — 80053 COMPREHEN METABOLIC PANEL: CPT

## 2019-07-29 PROCEDURE — 80061 LIPID PANEL: CPT

## 2019-07-29 PROCEDURE — 86803 HEPATITIS C AB TEST: CPT

## 2019-07-30 ENCOUNTER — APPOINTMENT (OUTPATIENT)
Dept: PHYSICAL THERAPY | Facility: REHABILITATION | Age: 64
End: 2019-07-30
Attending: NURSE PRACTITIONER
Payer: COMMERCIAL

## 2019-07-30 LAB
ALBUMIN SERPL BCP-MCNC: 4.3 G/DL (ref 3.2–4.9)
ALBUMIN/GLOB SERPL: 1.3 G/DL
ALP SERPL-CCNC: 79 U/L (ref 30–99)
ALT SERPL-CCNC: 37 U/L (ref 2–50)
ANION GAP SERPL CALC-SCNC: 9 MMOL/L (ref 0–11.9)
AST SERPL-CCNC: 27 U/L (ref 12–45)
BILIRUB SERPL-MCNC: 0.5 MG/DL (ref 0.1–1.5)
BUN SERPL-MCNC: 17 MG/DL (ref 8–22)
CALCIUM SERPL-MCNC: 9.3 MG/DL (ref 8.5–10.5)
CHLORIDE SERPL-SCNC: 108 MMOL/L (ref 96–112)
CHOLEST SERPL-MCNC: 215 MG/DL (ref 100–199)
CO2 SERPL-SCNC: 27 MMOL/L (ref 20–33)
CREAT SERPL-MCNC: 0.66 MG/DL (ref 0.5–1.4)
FASTING STATUS PATIENT QL REPORTED: NORMAL
GLOBULIN SER CALC-MCNC: 3.4 G/DL (ref 1.9–3.5)
GLUCOSE SERPL-MCNC: 93 MG/DL (ref 65–99)
HDLC SERPL-MCNC: 62 MG/DL
LDLC SERPL CALC-MCNC: 133 MG/DL
POTASSIUM SERPL-SCNC: 3.9 MMOL/L (ref 3.6–5.5)
PROT SERPL-MCNC: 7.7 G/DL (ref 6–8.2)
SODIUM SERPL-SCNC: 144 MMOL/L (ref 135–145)
TRIGL SERPL-MCNC: 101 MG/DL (ref 0–149)

## 2019-07-31 ENCOUNTER — HOSPITAL ENCOUNTER (OUTPATIENT)
Facility: MEDICAL CENTER | Age: 64
End: 2019-07-31
Attending: NURSE PRACTITIONER
Payer: COMMERCIAL

## 2019-07-31 PROCEDURE — 82274 ASSAY TEST FOR BLOOD FECAL: CPT

## 2019-08-01 ENCOUNTER — APPOINTMENT (OUTPATIENT)
Dept: PHYSICAL THERAPY | Facility: REHABILITATION | Age: 64
End: 2019-08-01
Payer: COMMERCIAL

## 2019-08-06 ENCOUNTER — APPOINTMENT (OUTPATIENT)
Dept: PHYSICAL THERAPY | Facility: REHABILITATION | Age: 64
End: 2019-08-06
Payer: COMMERCIAL

## 2019-08-07 DIAGNOSIS — Z12.11 SCREENING FOR COLORECTAL CANCER: ICD-10-CM

## 2019-08-07 DIAGNOSIS — Z12.12 SCREENING FOR COLORECTAL CANCER: ICD-10-CM

## 2019-08-07 LAB — HEMOCCULT STL QL IA: NEGATIVE

## 2019-08-08 ENCOUNTER — APPOINTMENT (OUTPATIENT)
Dept: PHYSICAL THERAPY | Facility: REHABILITATION | Age: 64
End: 2019-08-08
Payer: COMMERCIAL

## 2019-08-13 ENCOUNTER — APPOINTMENT (OUTPATIENT)
Dept: PHYSICAL THERAPY | Facility: REHABILITATION | Age: 64
End: 2019-08-13
Payer: COMMERCIAL

## 2019-08-15 ENCOUNTER — APPOINTMENT (OUTPATIENT)
Dept: PHYSICAL THERAPY | Facility: REHABILITATION | Age: 64
End: 2019-08-15
Payer: COMMERCIAL

## 2019-08-19 ENCOUNTER — APPOINTMENT (OUTPATIENT)
Dept: PHYSICAL THERAPY | Facility: REHABILITATION | Age: 64
End: 2019-08-19
Payer: COMMERCIAL

## 2019-08-26 ENCOUNTER — APPOINTMENT (OUTPATIENT)
Dept: PHYSICAL THERAPY | Facility: REHABILITATION | Age: 64
End: 2019-08-26
Payer: COMMERCIAL

## 2019-10-30 ENCOUNTER — HOSPITAL ENCOUNTER (OUTPATIENT)
Dept: RADIOLOGY | Facility: MEDICAL CENTER | Age: 64
End: 2019-10-30
Attending: NURSE PRACTITIONER
Payer: COMMERCIAL

## 2019-10-30 DIAGNOSIS — Z12.31 ENCOUNTER FOR SCREENING MAMMOGRAM FOR MALIGNANT NEOPLASM OF BREAST: ICD-10-CM

## 2019-10-30 PROCEDURE — 77063 BREAST TOMOSYNTHESIS BI: CPT

## 2019-11-15 ENCOUNTER — HOSPITAL ENCOUNTER (OUTPATIENT)
Facility: MEDICAL CENTER | Age: 64
End: 2019-11-15
Attending: NURSE PRACTITIONER
Payer: COMMERCIAL

## 2019-11-15 ENCOUNTER — OFFICE VISIT (OUTPATIENT)
Dept: MEDICAL GROUP | Facility: MEDICAL CENTER | Age: 64
End: 2019-11-15
Payer: COMMERCIAL

## 2019-11-15 VITALS
RESPIRATION RATE: 16 BRPM | HEART RATE: 81 BPM | TEMPERATURE: 98.1 F | SYSTOLIC BLOOD PRESSURE: 122 MMHG | DIASTOLIC BLOOD PRESSURE: 80 MMHG | WEIGHT: 203 LBS | HEIGHT: 66 IN | OXYGEN SATURATION: 94 % | BODY MASS INDEX: 32.62 KG/M2

## 2019-11-15 DIAGNOSIS — D22.9 ATYPICAL NEVUS OF MULTIPLE SITES: ICD-10-CM

## 2019-11-15 DIAGNOSIS — Z12.4 PAP SMEAR FOR CERVICAL CANCER SCREENING: ICD-10-CM

## 2019-11-15 DIAGNOSIS — Z78.0 POSTMENOPAUSAL: ICD-10-CM

## 2019-11-15 PROBLEM — S92.309A CLOSED FRACTURE OF METATARSAL BONE: Status: ACTIVE | Noted: 2018-12-21

## 2019-11-15 PROCEDURE — 87624 HPV HI-RISK TYP POOLED RSLT: CPT

## 2019-11-15 PROCEDURE — 99396 PREV VISIT EST AGE 40-64: CPT | Performed by: NURSE PRACTITIONER

## 2019-11-15 PROCEDURE — 88175 CYTOPATH C/V AUTO FLUID REDO: CPT

## 2019-11-15 NOTE — PROGRESS NOTES
CC:  Pap/Well Woman Exam    History of present illness:    Samantha Yu is 64 y.o. female presenting today for well woman exam with gynecological exam and Pap smear.   She reports her periods are  absent, she is post- menopausal.  Hx of tubal ligation. .  She is currently not exercising on a routine basis.     She is requesting a referral to dermatology as she has some moles on her neck, behind her ear and they itch and they have become bothersome to her.    Last mammogram:10/2019  Last Dexa scan: n/a-due  Last colonoscopy: last FIT test was 2019    Past Medical History:   Diagnosis Date   • Cataract 2018    Bilateral - early stages, no surgery   • Fatty liver    • Glaucoma     Bilateral   • Heart burn    • Hemorrhagic disorder (HCC) 2018    AML leukemia, dx    • High cholesterol     No meds   • History of anemia 2018    Not an issue now   • Murmur    • Seasonal allergies    • Urinary incontinence     With coughing, sneezing       Past Surgical History:   Procedure Laterality Date   • FOOT ORIF Right 2018    Procedure: FOOT ORIF-  METATARSAL;  Surgeon: Wm Richie Amado M.D.;  Location: SURGERY [facility];  Service: Orthopedics   • EYE SURGERY Bilateral     d/t pressure in eye from glaucoma   • OTHER  2002    Bone marrow transplant   • TUBAL LIGATION         Outpatient Encounter Medications as of 11/15/2019   Medication Sig Dispense Refill   • AZOPT 1 % Suspension 1 Drop by Ophthalmic route 2 Times a Day.  3   • travoprost (TRAVATAN Z) 0.004 % Solution Place 1 Drop in both eyes every day.       No facility-administered encounter medications on file as of 11/15/2019.        Patient Active Problem List    Diagnosis Date Noted   • Dyslipidemia 2019   • History of ankle fusion 2019   • Other specified glaucoma 2019   • Closed fracture of metatarsal bone 2018   • Bone transplant status 2017   • Chronic seasonal allergic rhinitis due to pollen  10/31/2017   • Obesity (BMI 30-39.9) 10/31/2017   • Hx of acute myeloid leukemia in remission 10/31/2017       .  Social History     Socioeconomic History   • Marital status:      Spouse name: Not on file   • Number of children: Not on file   • Years of education: Not on file   • Highest education level: Not on file   Occupational History   • Not on file   Social Needs   • Financial resource strain: Not on file   • Food insecurity:     Worry: Not on file     Inability: Not on file   • Transportation needs:     Medical: Not on file     Non-medical: Not on file   Tobacco Use   • Smoking status: Never Smoker   • Smokeless tobacco: Never Used   Substance and Sexual Activity   • Alcohol use: Yes     Alcohol/week: 0.6 oz     Types: 1 Glasses of wine per week     Comment: 2 per month   • Drug use: No   • Sexual activity: Yes     Partners: Male   Lifestyle   • Physical activity:     Days per week: Not on file     Minutes per session: Not on file   • Stress: Not on file   Relationships   • Social connections:     Talks on phone: Not on file     Gets together: Not on file     Attends Jewish service: Not on file     Active member of club or organization: Not on file     Attends meetings of clubs or organizations: Not on file     Relationship status: Not on file   • Intimate partner violence:     Fear of current or ex partner: Not on file     Emotionally abused: Not on file     Physically abused: Not on file     Forced sexual activity: Not on file   Other Topics Concern   • Not on file   Social History Narrative   • Not on file       Family History   Problem Relation Age of Onset   • Cancer Mother         breast   • Cancer Maternal Grandmother         breast   • Heart Attack Paternal Grandfather    • Diabetes Paternal Grandfather          ROS: Denies Weight loss, fatigue, chest pain, SOB, bowel or bladder changes.  Denies musculoskeletal, neurological, or psychiatric problems.  Denies dysuria, dyspareunia or abnormal  "vaginal discharge.  States in a safe relationship YES      /80   Pulse 81   Temp 36.7 °C (98.1 °F)   Resp 16   Ht 1.676 m (5' 6\")   Wt 92.1 kg (203 lb)   SpO2 94%   BMI 32.77 kg/m²     GEN:  Appears well and in no apparent distress   NECK:  Supple without adenopathy or thyromegaly  LUNGS:  Clear to auscultation.  Normal breath sounds.  CV:  RRR without murmers or S3 or S4.  Peripheral pulses intact.  BREAST: deferred.   ABD:  Soft, non-tender, non-distended, normal bowel sounds.  No hepatosplenomegaly.  :  Normal external female genitalia. Vaginal atrophy, labial agglutination, pale dry tissue.  Vaginal canal clear.  Cervix appears normal.  Bimanual exam:  No CMT, normal size uterus without masses or tenderness; no adnexal masses or tenderness.      Assessment and plan      1. Pap smear for cervical cancer screening  - THINPREP PAP WITH HPV; Future    2. Atypical nevus of multiple sites  - REFERRAL TO DERMATOLOGY    3. Postmenopausal  - DS-BONE DENSITY STUDY (DEXA); Future      Recommend 1200mg of Calcium and at least 1000IU of Vitamin D3, weight bearing exercises.     Have encouraged the patient to have a heart healthy diet, rich in fruits and vegetables. Limit alcohol use, avoid tobacco products. Participate in physical activity at least 3-5 times weekly. Take a multivitamin daily. Continue with recommended screenings and immunizations.         A chaperone was offered to the patient during today's exam. Chaperone name: Avelina was present.    I have placed the below orders and discussed them with an approved delegating provider.  The MA is performing the below orders under the direction of Dr. Montano      Pap Smear  Specimen collected today will await results from the lab.  "

## 2019-11-16 DIAGNOSIS — Z12.4 PAP SMEAR FOR CERVICAL CANCER SCREENING: ICD-10-CM

## 2019-11-18 LAB
CYTOLOGY REG CYTOL: NORMAL
HPV HR 12 DNA CVX QL NAA+PROBE: NEGATIVE
HPV16 DNA SPEC QL NAA+PROBE: NEGATIVE
HPV18 DNA SPEC QL NAA+PROBE: NEGATIVE
SPECIMEN SOURCE: NORMAL

## 2019-11-25 ENCOUNTER — OFFICE VISIT (OUTPATIENT)
Dept: DERMATOLOGY | Facility: IMAGING CENTER | Age: 64
End: 2019-11-25
Payer: COMMERCIAL

## 2019-11-25 VITALS — BODY MASS INDEX: 31.82 KG/M2 | TEMPERATURE: 98.5 F | WEIGHT: 198 LBS | HEIGHT: 66 IN

## 2019-11-25 DIAGNOSIS — L82.1 SEBORRHEIC KERATOSES: ICD-10-CM

## 2019-11-25 DIAGNOSIS — Z12.83 SKIN CANCER SCREENING: ICD-10-CM

## 2019-11-25 DIAGNOSIS — L81.4 LENTIGINES: ICD-10-CM

## 2019-11-25 DIAGNOSIS — D18.01 CHERRY ANGIOMA: ICD-10-CM

## 2019-11-25 DIAGNOSIS — D48.5 NEOPLASM OF UNCERTAIN BEHAVIOR OF SKIN: ICD-10-CM

## 2019-11-25 DIAGNOSIS — L82.0 INFLAMED SEBORRHEIC KERATOSIS: ICD-10-CM

## 2019-11-25 DIAGNOSIS — R20.8 OTHER DISTURBANCES OF SKIN SENSATION: ICD-10-CM

## 2019-11-25 PROCEDURE — 17110 DESTRUCTION B9 LES UP TO 14: CPT | Performed by: DERMATOLOGY

## 2019-11-25 PROCEDURE — 99203 OFFICE O/P NEW LOW 30 MIN: CPT | Mod: 25 | Performed by: DERMATOLOGY

## 2019-11-25 PROCEDURE — 11103 TANGNTL BX SKIN EA SEP/ADDL: CPT | Performed by: DERMATOLOGY

## 2019-11-25 PROCEDURE — 11102 TANGNTL BX SKIN SINGLE LES: CPT | Mod: 59 | Performed by: DERMATOLOGY

## 2019-11-25 NOTE — PROGRESS NOTES
"DERMATOLOGY NOTE  NEW VISIT     11/25/19  Samantha Yu  1955  MRN: [id number]     Chief complaint: Establish Care       Samantha Yu is a 64 y.o. female who presents for evaluation of multiple skin lesions to check for skin cancer.    HPI/location: \"mole\"  behind left ear mole  Time present: 1 year   Painful lesion: No  Itching lesion: Yes  Enlarging lesion: Yes  Anything make it better or worse?  Caught on onto stuff it hurts     HPI/location: \"mole\"  Forehead x 2, scaly     Time present: 2-3 moths   Painful lesion: No  Itching lesion: Yes  Enlarging lesion: No  Anything make it better or worse? no    HPI/location: \"mole\"  torso area rough feeling   Time present: 2-3+ years   Painful lesion: No  Itching lesion: Yes  Enlarging lesion: No  Anything make it better or worse? no    History of skin cancer: No  History of precancers/actinic keratoses: No  History of biopsies:Yes, Details: moles removed, 5+ years, benign    History of blistering/severe sunburns:No  Family history of skin cancer:No  Family history of atypical moles:No        Past Medical History:   Diagnosis Date   • Cataract 12/2018    Bilateral - early stages, no surgery   • Fatty liver    • Glaucoma     Bilateral   • Heart burn    • Hemorrhagic disorder (HCC) 12/2018    AML leukemia, dx 2001   • High cholesterol     No meds   • History of anemia 12/2018    Not an issue now   • Murmur    • Seasonal allergies    • Urinary incontinence     With coughing, sneezing        Family History   Problem Relation Age of Onset   • Cancer Mother         breast   • Cancer Maternal Grandmother         breast   • Heart Attack Paternal Grandfather    • Diabetes Paternal Grandfather         Social History     Socioeconomic History   • Marital status:      Spouse name: Not on file   • Number of children: Not on file   • Years of education: Not on file   • Highest education level: Not on file   Occupational History   • Not on file   Social Needs   • " "Financial resource strain: Not on file   • Food insecurity:     Worry: Not on file     Inability: Not on file   • Transportation needs:     Medical: Not on file     Non-medical: Not on file   Tobacco Use   • Smoking status: Never Smoker   • Smokeless tobacco: Never Used   Substance and Sexual Activity   • Alcohol use: Yes     Alcohol/week: 0.6 oz     Types: 1 Glasses of wine per week     Comment: 2 per month   • Drug use: No   • Sexual activity: Yes     Partners: Male   Lifestyle   • Physical activity:     Days per week: Not on file     Minutes per session: Not on file   • Stress: Not on file   Relationships   • Social connections:     Talks on phone: Not on file     Gets together: Not on file     Attends Baptism service: Not on file     Active member of club or organization: Not on file     Attends meetings of clubs or organizations: Not on file     Relationship status: Not on file   • Intimate partner violence:     Fear of current or ex partner: Not on file     Emotionally abused: Not on file     Physically abused: Not on file     Forced sexual activity: Not on file   Other Topics Concern   • Not on file   Social History Narrative   • Not on file        Allergies   Allergen Reactions   • Sulfa Drugs      hives        MEDICATIONS:  Medications relevant to specialty reviewed.    Current Outpatient Medications:   •  AZOPT 1 % Suspension, 1 Drop by Ophthalmic route 2 Times a Day., Disp: , Rfl: 3  •  travoprost (TRAVATAN Z) 0.004 % Solution, Place 1 Drop in both eyes every day., Disp: , Rfl:      REVIEW OF SYSTEMS:   Positive for skin (see HPI)  Negative for fevers and chills  All other systems reviewed and are negative.     EXAM:  Temp 36.9 °C (98.5 °F)   Ht 1.676 m (5' 6\")   Wt 89.8 kg (198 lb)   BMI 31.96 kg/m²   Constitutional: Well-developed, well-nourished, and in no distress.   HENT:   Head: normocephalic and atraumatic.  Right Ear: External ear normal.   Left Ear: External ear normal.   Nose: Nose normal. "   Mouth/Throat: Oropharynx is clear and moist.   Eyes: Conjunctivae and lids are normal.   Neck: Normal range of motion. Neck supple.   Pulmonary/Chest: Effort normal.   Neurological: Alert and oriented.    A total body skin exam was performed including the scalp, hair, face, eyelids, nose, lips, oral cavity, ears, neck, back, buttocks, chest, abdomen, bilateral upper and lower extremities including hands, feet, digits and nails.  The genital exam was deferred to her gynecologist.  Notable findings on exam today listed below. Remaining above-listed examined areas within normal limits / negative for rashes or lesions.     -left postauricular scalp with 6 mm brown waxy papule with white area and erythematous periphery  -right anterior lower leg with 8mm pink pearly thin papule  -forehead x 2 with waxy brown papules with surrounding erythema and heme-crusted pinpoint erosions on dermoscopy  -sun exposed skin of trunk and b/l upper and lower extremities with scattered clinically benign light brown reticulated macules all of which were morphologically similar and none of which were suspicious for skin cancer today on exam  -trunk and extremities with scattered brown-grey, waxy, hyperkeratotic papules and plaques  -trunk and extremities with cherry red macules and papules     IMPRESSION / PLAN:    1. Neoplasm of uncertain behavior of skin  - Discussed monitoring vs biopsy, patient agreeable to biopsy today, see procedure note below  - Biopsy Procedure Note: biopsy by shave technique  - Location:   A. Left postauricular  B. Right lower leg  - Size: as noted in exam  - Total specimens sent for pathology: 2  - Photo taken with patient permission (see media tab)  - Preoperative diagnosis:   A. R/o ISK vs nmsc vs other  B. R/o bcc vs other     Shave bx x 2  Risks, benefits and alternatives of procedure discussed, verbal consent obtained for photo and written informed consent obtained for procedure. Time out completed. Area of  biopsy prepped with alcohol. Anesthesia with 1% lidocaine with epinephrine administered with 30 gauge needle. Shave biopsy of the site performed. Hemostasis achieved with aluminum chloride. Vaseline applied to wound with bandage. Patient tolerated procedure well and there were no complications. The specimen was sent to the pathology lab by the staff. Wound care was discussed.    2. Inflamed seborrheic keratosis, +itching (3. Other disturbances of skin sensation)  -nature of the diagnosis was discussed in detail  -clinically benign today on exam, reassurance was given  -offered treatment with cryotherapy given lesions are symptomatic and inflamed on exam, patient wishes to pursue treatment today    Procedure Note: Informed verbal consent including risk of redness, swelling, pain, blistering, scar, bleeding, infection and need for possible further treatment was obtained. The location(s) was/were identified by the patient and the physician. The lesion(s) was/were treated with cryotherapy for a total of 2 freeze/thaw cycles with the open spray technique. The patient tolerated the procedure well.  Wound care was discussed.   Location: as noted in exam  Total # of lesions treated:  2    4. Lentigines  - Discussed benign nature of lesions, related to sun exposure  - Discussed sun protection    5. Seborrheic keratoses  -nature of the diagnosis was discussed in detail  -clinically benign today on exam, reassurance was given  -continue to monitor for any changes, pt to call if any changes occur    6. Cherry angioma  -nature of the diagnosis was discussed in detail  -clinically benign today on exam, reassurance was given  -continue to monitor for any changes, pt to call if any changes occur    7. Skin cancer screening  Skin cancer education  - discussed importance of sun protective clothing, eyewear  - discussed importance of daily use of broad spectrum sunscreen with SPF 30 or greater, as well as need for reapplication ~every  2 hours when exposed to UVR  - discussed importance of regular self-exams, ideally once per month, every 12 months exams in clinic  - ABCDE's of melanoma discussed  - patient to bring any new or concerning lesions to my attention     Return to clinic in: Return pending pathology. or yearly for HECTOR and as needed for any new or changing skin lesions.    Blaire Bob M.D.

## 2019-12-03 ENCOUNTER — TELEPHONE (OUTPATIENT)
Dept: DERMATOLOGY | Facility: IMAGING CENTER | Age: 64
End: 2019-12-03

## 2019-12-03 NOTE — TELEPHONE ENCOUNTER
Spoke to patient informed of results. Patient opting for ED&C, transferred to scheduling for appt.

## 2019-12-03 NOTE — TELEPHONE ENCOUNTER
Called patient. No answer. Left voicemail for patient to call back for results.    Pathology was consistent with a    A. Benign (SK). No further treatment indicated.   B. Malignant (BCC, superficial type). Requires further tx. Recommend ED&C vs topicals vs WLE.    See media tab for path report.

## 2019-12-20 ENCOUNTER — OFFICE VISIT (OUTPATIENT)
Dept: DERMATOLOGY | Facility: IMAGING CENTER | Age: 64
End: 2019-12-20
Payer: COMMERCIAL

## 2019-12-20 VITALS
DIASTOLIC BLOOD PRESSURE: 80 MMHG | TEMPERATURE: 98.3 F | HEIGHT: 66 IN | BODY MASS INDEX: 31.34 KG/M2 | SYSTOLIC BLOOD PRESSURE: 134 MMHG | WEIGHT: 195 LBS

## 2019-12-20 DIAGNOSIS — C44.712 BASAL CELL CARCINOMA OF SKIN OF RIGHT LOWER LIMB, INCLUDING HIP: ICD-10-CM

## 2019-12-20 PROCEDURE — 17261 DSTRJ MAL LES T/A/L .6-1.0CM: CPT | Performed by: DERMATOLOGY

## 2019-12-20 NOTE — PROGRESS NOTES
"PROCEDURE NOTE:    CURETTAGE &  ELECTRODESICCATION    After patient received diagnosis of basal cell carcinoma, superficial type, further management was discussed, including Mohs vs wide local excision vs curettage & electrodesiccation (C&ED) vs radiation therapy vs topical creams vs cryotherapy. Patient opted for ED&C. Risks, benefits and alternatives of procedure, including, but not limited to scar, bleeding, pain, infection, recurrence and need for further surgery, were discussed and written informed consent obtained. Verbal time out completed.     Allergies reviewed: Yes  Pacemaker/defibrillator: No  Artificial joints: No  Antibiotics given: No  Blood thinners/anticoagulants: No    Pre-op diagnosis: superficial BCC  Post-op diagnosis: Same  Site: right lower leg  Pre-op size: 0.8 cm    /80   Temp 36.8 °C (98.3 °F)   Ht 1.676 m (5' 6\")   Wt 88.5 kg (195 lb)     Procedure: Area of biopsy prepped with alcohol, marked with sterile marking pen. Anesthesia with 1% lidocaine with epinephrine administered with 30 gauge needle. The area was again cleaned with povidine-iodine swab. The site was debulked with a sharp, 5mm curette, and scraped in 3 different directions; this was repeated with a 3mm curette. The curettaged area was then electrodesiccated for hemostasis (hyfrecator). This entire cycle was repeated three times, and hemostasis was achieved. Vaseline applied to wound with bandage. Patient tolerated procedure well and there were no complications, blood loss was minimal.     Final wound size: 1 cm    Wound care was discussed with the patient, and written instructions were provided. Patient to return to clinic in 5 months for wound check and total skin exam. Patient to call us if any problems or concerns with the procedure site arise prior to scheduled appointment.    Blaire Bob M.D.        "

## 2020-09-17 ENCOUNTER — HOSPITAL ENCOUNTER (EMERGENCY)
Facility: MEDICAL CENTER | Age: 65
End: 2020-09-17
Attending: EMERGENCY MEDICINE
Payer: COMMERCIAL

## 2020-09-17 ENCOUNTER — APPOINTMENT (OUTPATIENT)
Dept: RADIOLOGY | Facility: MEDICAL CENTER | Age: 65
End: 2020-09-17
Attending: EMERGENCY MEDICINE
Payer: COMMERCIAL

## 2020-09-17 VITALS
TEMPERATURE: 97.7 F | WEIGHT: 190 LBS | HEIGHT: 67 IN | SYSTOLIC BLOOD PRESSURE: 131 MMHG | HEART RATE: 78 BPM | BODY MASS INDEX: 29.82 KG/M2 | OXYGEN SATURATION: 96 % | RESPIRATION RATE: 16 BRPM | DIASTOLIC BLOOD PRESSURE: 65 MMHG

## 2020-09-17 DIAGNOSIS — N20.0 KIDNEY STONE: ICD-10-CM

## 2020-09-17 LAB
ALBUMIN SERPL BCP-MCNC: 4.6 G/DL (ref 3.2–4.9)
ALBUMIN/GLOB SERPL: 1.4 G/DL
ALP SERPL-CCNC: 91 U/L (ref 30–99)
ALT SERPL-CCNC: 33 U/L (ref 2–50)
ANION GAP SERPL CALC-SCNC: 13 MMOL/L (ref 7–16)
APPEARANCE UR: CLEAR
AST SERPL-CCNC: 23 U/L (ref 12–45)
BACTERIA #/AREA URNS HPF: NEGATIVE /HPF
BASOPHILS # BLD AUTO: 0.6 % (ref 0–1.8)
BASOPHILS # BLD: 0.05 K/UL (ref 0–0.12)
BILIRUB SERPL-MCNC: 0.4 MG/DL (ref 0.1–1.5)
BILIRUB UR QL STRIP.AUTO: NEGATIVE
BUN SERPL-MCNC: 11 MG/DL (ref 8–22)
CALCIUM SERPL-MCNC: 9.7 MG/DL (ref 8.5–10.5)
CHLORIDE SERPL-SCNC: 103 MMOL/L (ref 96–112)
CO2 SERPL-SCNC: 26 MMOL/L (ref 20–33)
COLOR UR: YELLOW
CREAT SERPL-MCNC: 0.52 MG/DL (ref 0.5–1.4)
EOSINOPHIL # BLD AUTO: 0.07 K/UL (ref 0–0.51)
EOSINOPHIL NFR BLD: 0.8 % (ref 0–6.9)
EPI CELLS #/AREA URNS HPF: NEGATIVE /HPF
ERYTHROCYTE [DISTWIDTH] IN BLOOD BY AUTOMATED COUNT: 53.5 FL (ref 35.9–50)
GLOBULIN SER CALC-MCNC: 3.4 G/DL (ref 1.9–3.5)
GLUCOSE SERPL-MCNC: 105 MG/DL (ref 65–99)
GLUCOSE UR STRIP.AUTO-MCNC: NEGATIVE MG/DL
HCT VFR BLD AUTO: 47.1 % (ref 37–47)
HGB BLD-MCNC: 15.6 G/DL (ref 12–16)
HYALINE CASTS #/AREA URNS LPF: ABNORMAL /LPF
IMM GRANULOCYTES # BLD AUTO: 0.03 K/UL (ref 0–0.11)
IMM GRANULOCYTES NFR BLD AUTO: 0.3 % (ref 0–0.9)
KETONES UR STRIP.AUTO-MCNC: NEGATIVE MG/DL
LEUKOCYTE ESTERASE UR QL STRIP.AUTO: NEGATIVE
LYMPHOCYTES # BLD AUTO: 1.72 K/UL (ref 1–4.8)
LYMPHOCYTES NFR BLD: 20 % (ref 22–41)
MCH RBC QN AUTO: 32.4 PG (ref 27–33)
MCHC RBC AUTO-ENTMCNC: 33.1 G/DL (ref 33.6–35)
MCV RBC AUTO: 97.9 FL (ref 81.4–97.8)
MICRO URNS: ABNORMAL
MONOCYTES # BLD AUTO: 0.84 K/UL (ref 0–0.85)
MONOCYTES NFR BLD AUTO: 9.7 % (ref 0–13.4)
NEUTROPHILS # BLD AUTO: 5.91 K/UL (ref 2–7.15)
NEUTROPHILS NFR BLD: 68.6 % (ref 44–72)
NITRITE UR QL STRIP.AUTO: NEGATIVE
NRBC # BLD AUTO: 0.02 K/UL
NRBC BLD-RTO: 0.2 /100 WBC
PH UR STRIP.AUTO: 6 [PH] (ref 5–8)
PLATELET # BLD AUTO: 414 K/UL (ref 164–446)
PMV BLD AUTO: 8.8 FL (ref 9–12.9)
POTASSIUM SERPL-SCNC: 4.3 MMOL/L (ref 3.6–5.5)
PROT SERPL-MCNC: 8 G/DL (ref 6–8.2)
PROT UR QL STRIP: NEGATIVE MG/DL
RBC # BLD AUTO: 4.81 M/UL (ref 4.2–5.4)
RBC # URNS HPF: ABNORMAL /HPF
RBC UR QL AUTO: ABNORMAL
SODIUM SERPL-SCNC: 142 MMOL/L (ref 135–145)
SP GR UR STRIP.AUTO: 1.02
UROBILINOGEN UR STRIP.AUTO-MCNC: 1 MG/DL
WBC # BLD AUTO: 8.6 K/UL (ref 4.8–10.8)
WBC #/AREA URNS HPF: ABNORMAL /HPF

## 2020-09-17 PROCEDURE — 700105 HCHG RX REV CODE 258: Mod: EDC | Performed by: EMERGENCY MEDICINE

## 2020-09-17 PROCEDURE — 74176 CT ABD & PELVIS W/O CONTRAST: CPT

## 2020-09-17 PROCEDURE — 80053 COMPREHEN METABOLIC PANEL: CPT | Mod: EDC

## 2020-09-17 PROCEDURE — 700111 HCHG RX REV CODE 636 W/ 250 OVERRIDE (IP): Mod: EDC | Performed by: EMERGENCY MEDICINE

## 2020-09-17 PROCEDURE — 99284 EMERGENCY DEPT VISIT MOD MDM: CPT | Mod: EDC

## 2020-09-17 PROCEDURE — 96374 THER/PROPH/DIAG INJ IV PUSH: CPT | Mod: EDC

## 2020-09-17 PROCEDURE — 36415 COLL VENOUS BLD VENIPUNCTURE: CPT | Mod: EDC

## 2020-09-17 PROCEDURE — 85025 COMPLETE CBC W/AUTO DIFF WBC: CPT | Mod: EDC

## 2020-09-17 PROCEDURE — 81001 URINALYSIS AUTO W/SCOPE: CPT | Mod: EDC

## 2020-09-17 RX ORDER — HYDROCODONE BITARTRATE AND ACETAMINOPHEN 5; 325 MG/1; MG/1
1-2 TABLET ORAL EVERY 6 HOURS PRN
Qty: 15 TAB | Refills: 0 | Status: SHIPPED | OUTPATIENT
Start: 2020-09-17 | End: 2020-09-19

## 2020-09-17 RX ORDER — SODIUM CHLORIDE 9 MG/ML
1000 INJECTION, SOLUTION INTRAVENOUS ONCE
Status: COMPLETED | OUTPATIENT
Start: 2020-09-17 | End: 2020-09-17

## 2020-09-17 RX ORDER — KETOROLAC TROMETHAMINE 30 MG/ML
15 INJECTION, SOLUTION INTRAMUSCULAR; INTRAVENOUS ONCE
Status: COMPLETED | OUTPATIENT
Start: 2020-09-17 | End: 2020-09-17

## 2020-09-17 RX ADMIN — KETOROLAC TROMETHAMINE 15 MG: 30 INJECTION, SOLUTION INTRAMUSCULAR at 09:02

## 2020-09-17 RX ADMIN — SODIUM CHLORIDE 1000 ML: 9 INJECTION, SOLUTION INTRAVENOUS at 09:02

## 2020-09-17 ASSESSMENT — FIBROSIS 4 INDEX: FIB4 SCORE: 0.68

## 2020-09-17 NOTE — ED PROVIDER NOTES
ED Provider Note    CHIEF COMPLAINT  Chief Complaint   Patient presents with   • Flank Pain     RT sided flank pain since this morning. reports urge to urinate but not much comes out.    • Urinary Frequency       HPI  Samantha Yu is a 65 y.o. female who presents right-sided flank pain.  She had an episode a few weeks ago.  She had an episode on Labor Day.  She had another episode today.  States rather sudden onset right upper quadrant right flank pain.  Sharp character.  Associated urinary urgency feels that she needs to void, but very little comes out.  She has not noticed hematuria.  The pain does not radiate to the anterior abdomen or right lower quadrant.  She is not had any vaginal bleeding.  Last period was 18 years ago.  She denies pleuritic pain, cough hemoptysis.  No rash.  Not associated with the eating.  At worst the pain was a 10/10.  Currently it is a 2 or 3 out of 10 without intervention.    REVIEW OF SYSTEMS  As per HPI, otherwise a 10 point review of systems is negative    PAST MEDICAL HISTORY  Past Medical History:   Diagnosis Date   • Cataract 12/2018    Bilateral - early stages, no surgery   • Fatty liver    • Glaucoma     Bilateral   • Heart burn    • Hemorrhagic disorder (HCC) 12/2018    AML leukemia, dx 2001   • High cholesterol     No meds   • History of anemia 12/2018    Not an issue now   • Murmur    • Seasonal allergies    • Urinary incontinence     With coughing, sneezing       SOCIAL HISTORY  Social History     Tobacco Use   • Smoking status: Never Smoker   • Smokeless tobacco: Never Used   Substance Use Topics   • Alcohol use: Yes     Alcohol/week: 0.6 oz     Types: 1 Glasses of wine per week     Comment: 2 per month   • Drug use: No       SURGICAL HISTORY  Past Surgical History:   Procedure Laterality Date   • FOOT ORIF Right 12/27/2018    Procedure: FOOT ORIF-  METATARSAL;  Surgeon: Wm Richie Amado M.D.;  Location: SURGERY [facility];  Service: Orthopedics   • EYE  "SURGERY Bilateral 2015    d/t pressure in eye from glaucoma   • OTHER  05/2002    Bone marrow transplant   • TUBAL LIGATION  1996       CURRENT MEDICATIONS  Home Medications     Reviewed by Thomas Zamora R.N. (Registered Nurse) on 09/17/20 at 0719  Med List Status: Partial   Medication Last Dose Status   AZOPT 1 % Suspension  Active   travoprost (TRAVATAN Z) 0.004 % Solution  Active                ALLERGIES  Allergies   Allergen Reactions   • Sulfa Drugs      hives       PHYSICAL EXAM  VITAL SIGNS: BP (!) 181/91   Pulse 79   Temp 36.2 °C (97.2 °F) (Temporal)   Resp 17   Ht 1.702 m (5' 7\")   Wt 86.2 kg (190 lb)   SpO2 97%   BMI 29.76 kg/m²    Constitutional: Awake and alert  HENT:  Atraumatic, Normocephalic.Oropharynx dry mucus membranes, Nose normal inspection.   Eyes: Normal inspection  Neck: Supple  Cardiovascular: Normal heart rate, Normal rhythm.  Symmetric peripheral pulses.   Thorax & Lungs: No respiratory distress, No wheezing, No rales, No rhonchi, No chest tenderness.   Abdomen: Bowel sounds normal, soft, non-distended, nontender, no mass  Skin: Warm, Dry, No rash.   Back: No tenderness, No CVA tenderness.   Extremities: No clubbing, cyanosis, edema, no Homans or cords   Neurologic: Grossly normal   Psychiatric: Anxious appearing    RADIOLOGY/PROCEDURES  CT-RENAL COLIC EVALUATION(A/P W/O)   Final Result      1.  3 mm right ureterovesical junction stone. No significant hydronephrosis.      2.  4 cm right ovarian cyst.      3.  Atrophic calcified spleen.           Imaging is interpreted by radiologist    Labs:  Results for orders placed or performed during the hospital encounter of 09/17/20   URINALYSIS CULTURE, IF INDICATED    Specimen: Urine   Result Value Ref Range    Color Yellow     Character Clear     Specific Gravity 1.019 <1.035    Ph 6.0 5.0 - 8.0    Glucose Negative Negative mg/dL    Ketones Negative Negative mg/dL    Protein Negative Negative mg/dL    Bilirubin Negative Negative    " Urobilinogen, Urine 1.0 Negative    Nitrite Negative Negative    Leukocyte Esterase Negative Negative    Occult Blood Moderate (A) Negative    Micro Urine Req Microscopic    URINE MICROSCOPIC (W/UA)   Result Value Ref Range    WBC 0-2 /hpf    RBC  (A) /hpf    Bacteria Negative None /hpf    Epithelial Cells Negative /hpf    Hyaline Cast 0-2 /lpf   CBC WITH DIFFERENTIAL   Result Value Ref Range    WBC 8.6 4.8 - 10.8 K/uL    RBC 4.81 4.20 - 5.40 M/uL    Hemoglobin 15.6 12.0 - 16.0 g/dL    Hematocrit 47.1 (H) 37.0 - 47.0 %    MCV 97.9 (H) 81.4 - 97.8 fL    MCH 32.4 27.0 - 33.0 pg    MCHC 33.1 (L) 33.6 - 35.0 g/dL    RDW 53.5 (H) 35.9 - 50.0 fL    Platelet Count 414 164 - 446 K/uL    MPV 8.8 (L) 9.0 - 12.9 fL    Neutrophils-Polys 68.60 44.00 - 72.00 %    Lymphocytes 20.00 (L) 22.00 - 41.00 %    Monocytes 9.70 0.00 - 13.40 %    Eosinophils 0.80 0.00 - 6.90 %    Basophils 0.60 0.00 - 1.80 %    Immature Granulocytes 0.30 0.00 - 0.90 %    Nucleated RBC 0.20 /100 WBC    Neutrophils (Absolute) 5.91 2.00 - 7.15 K/uL    Lymphs (Absolute) 1.72 1.00 - 4.80 K/uL    Monos (Absolute) 0.84 0.00 - 0.85 K/uL    Eos (Absolute) 0.07 0.00 - 0.51 K/uL    Baso (Absolute) 0.05 0.00 - 0.12 K/uL    Immature Granulocytes (abs) 0.03 0.00 - 0.11 K/uL    NRBC (Absolute) 0.02 K/uL   Comp Metabolic Panel   Result Value Ref Range    Sodium 142 135 - 145 mmol/L    Potassium 4.3 3.6 - 5.5 mmol/L    Chloride 103 96 - 112 mmol/L    Co2 26 20 - 33 mmol/L    Anion Gap 13.0 7.0 - 16.0    Glucose 105 (H) 65 - 99 mg/dL    Bun 11 8 - 22 mg/dL    Creatinine 0.52 0.50 - 1.40 mg/dL    Calcium 9.7 8.5 - 10.5 mg/dL    AST(SGOT) 23 12 - 45 U/L    ALT(SGPT) 33 2 - 50 U/L    Alkaline Phosphatase 91 30 - 99 U/L    Total Bilirubin 0.4 0.1 - 1.5 mg/dL    Albumin 4.6 3.2 - 4.9 g/dL    Total Protein 8.0 6.0 - 8.2 g/dL    Globulin 3.4 1.9 - 3.5 g/dL    A-G Ratio 1.4 g/dL   ESTIMATED GFR   Result Value Ref Range    GFR If African American >60 >60 mL/min/1.73 m 2    GFR  If Non African American >60 >60 mL/min/1.73 m 2       Medications   NS infusion 1,000 mL (0 mL Intravenous Stopped 9/17/20 1009)   ketorolac (TORADOL) injection 15 mg (15 mg Intravenous Given 9/17/20 0902)       COURSE & MEDICAL DECISION MAKING  Patient presents with left flank pain history and physical as above.  Work-up initiated.  Treated with Toradol.  Given IV fluids for medical expulsive therapy.  Oral fluids not rapid enough.  Stable on recheck.  Data returned as above confirming stone.  She does not have a urinary tract infection.  Stone small enough should pass on its own.  Her pain resolved after Toradol.  Have given a prescription for Norco and advised NSAIDs as needed.  Plenty of fluids.  Given a strainer.  Follow-up with primary in 1 week.  Return to the ER for uncontrolled pain, fevers or concern.    FINAL IMPRESSION  1.  Ureteral lithiasis      This dictation was created using voice recognition software. The accuracy of the dictation is limited to the abilities of the software.  The nursing notes were reviewed and certain aspects of this information were incorporated into this note.      Electronically signed by: Baldo Cedeno M.D., 9/17/2020 8:32 AM

## 2020-09-17 NOTE — ED NOTES
Rounded on pt. Additional warm blankets provided. IV fluids infusing at this time. Aware of POC. Denies needs.

## 2020-09-17 NOTE — ED TRIAGE NOTES
"Chief Complaint   Patient presents with   • Flank Pain     RT sided flank pain since this morning. reports urge to urinate but not much comes out.    • Urinary Frequency     Pt to triage for above. Reports pain comes and goes.     Denies covid s/sx, denies travel or contact with it.     BP (!) 181/91   Pulse 79   Temp 36.2 °C (97.2 °F) (Temporal)   Resp 17   Ht 1.702 m (5' 7\")   Wt 86.2 kg (190 lb)   SpO2 97%   BMI 29.76 kg/m²     "

## 2020-09-17 NOTE — ED NOTES
Pt walked to yellow 40. Pt placed in gown. POC explained. Call light within reach. Denies needs at this time. Will continue to monitor.

## 2020-09-29 ENCOUNTER — OFFICE VISIT (OUTPATIENT)
Dept: MEDICAL GROUP | Facility: MEDICAL CENTER | Age: 65
End: 2020-09-29
Payer: COMMERCIAL

## 2020-09-29 VITALS
HEART RATE: 66 BPM | HEIGHT: 67 IN | BODY MASS INDEX: 30.45 KG/M2 | DIASTOLIC BLOOD PRESSURE: 78 MMHG | WEIGHT: 194 LBS | SYSTOLIC BLOOD PRESSURE: 108 MMHG | TEMPERATURE: 99 F | OXYGEN SATURATION: 97 %

## 2020-09-29 DIAGNOSIS — N20.1 URETEROLITHIASIS: ICD-10-CM

## 2020-09-29 DIAGNOSIS — R11.0 NAUSEA: ICD-10-CM

## 2020-09-29 DIAGNOSIS — R10.9 ACUTE RIGHT FLANK PAIN: ICD-10-CM

## 2020-09-29 DIAGNOSIS — N83.201 RIGHT OVARIAN CYST: ICD-10-CM

## 2020-09-29 PROCEDURE — 99213 OFFICE O/P EST LOW 20 MIN: CPT | Performed by: NURSE PRACTITIONER

## 2020-09-29 RX ORDER — HYDROCODONE BITARTRATE AND ACETAMINOPHEN 5; 325 MG/1; MG/1
1 TABLET ORAL EVERY 6 HOURS PRN
COMMUNITY
End: 2021-11-11

## 2020-09-29 RX ORDER — ONDANSETRON 4 MG/1
4 TABLET, FILM COATED ORAL EVERY 4 HOURS PRN
Qty: 20 TAB | Refills: 1 | Status: SHIPPED | OUTPATIENT
Start: 2020-09-29 | End: 2021-11-11

## 2020-09-29 ASSESSMENT — FIBROSIS 4 INDEX: FIB4 SCORE: 0.61

## 2020-09-29 ASSESSMENT — PATIENT HEALTH QUESTIONNAIRE - PHQ9: CLINICAL INTERPRETATION OF PHQ2 SCORE: 0

## 2020-09-29 NOTE — PROGRESS NOTES
Chief Complaint   Patient presents with   • Hospital Follow-up   • Nephrolithiasis     recent ER visit   • Ovarian Cyst     found on CT       Subjective:     HPI:     Samantha Yu is a 65 y.o. female here to discuss the evaluation and management of:    Presents to follow up ER visit with new diagnosis of ureterolithiasis.  Had been have intermittent symptoms for the last few weeks.  Patient presents to the emergency room and was diagnosed with ureterovesical junction stone.     Right Kidney Stone  Continues to have dull ache in her Right Flank area.  States that it is been more pronounced today.  Yesterday she felt she had a slight fever however this went away.  Denies any hematuria, frequency or urgency at this time.  She is been trying to stay hydrated.  She does report that her job but she is not very mindful of staying hydrated.  Denies any vomiting however does have slight nausea.      CT RENAL COLIC 9/17/2020  IMPRESSION:     1.  3 mm right ureterovesical junction stone. No significant hydronephrosis.     2.  4 cm right ovarian cyst.     3.  Atrophic calcified spleen.    Right Ovarian Cyst  Found incidentally on recent CT. No vaginal bleeding or RLQ discomfort. No hx of ovarian cysts.     ROS:  Denies any Headache, Blurred Vision, Confusion, Chest pain,  Shortness of breath,  Abdominal pain, Changes of bowel or bladder, Lower ext edema, Fevers, Nights sweats, Weight Changes, Focal weakness or numbness.  And all other systems reviewed and are all negative. POSITIVE FOR flank pain, nausea        Current Outpatient Medications:   •  HYDROcodone-acetaminophen (NORCO) 5-325 MG Tab per tablet, Take 1 Tab by mouth every 6 hours as needed., Disp: , Rfl:   •  ondansetron (ZOFRAN) 4 MG Tab tablet, Take 1 Tab by mouth every four hours as needed for Nausea/Vomiting., Disp: 20 Tab, Rfl: 1  •  travoprost (TRAVATAN Z) 0.004 % Solution, Place 1 Drop in both eyes every day., Disp: , Rfl:   •  AZOPT 1 % Suspension, 1 Drop  by Ophthalmic route 2 Times a Day., Disp: , Rfl: 3    Allergies   Allergen Reactions   • Sulfa Drugs      hives       Past Medical History:   Diagnosis Date   • Cataract 12/2018    Bilateral - early stages, no surgery   • Fatty liver    • Glaucoma     Bilateral   • Heart burn    • Hemorrhagic disorder (HCC) 12/2018    AML leukemia, dx 2001   • High cholesterol     No meds   • History of anemia 12/2018    Not an issue now   • Murmur    • Seasonal allergies    • Urinary incontinence     With coughing, sneezing     Past Surgical History:   Procedure Laterality Date   • FOOT ORIF Right 12/27/2018    Procedure: FOOT ORIF-  METATARSAL;  Surgeon: Wm Richie Amado M.D.;  Location: SURGERY [facility];  Service: Orthopedics   • EYE SURGERY Bilateral 2015    d/t pressure in eye from glaucoma   • OTHER  05/2002    Bone marrow transplant   • TUBAL LIGATION  1996     Family History   Problem Relation Age of Onset   • Cancer Mother         breast   • Cancer Maternal Grandmother         breast   • Heart Attack Paternal Grandfather    • Diabetes Paternal Grandfather      Social History     Socioeconomic History   • Marital status:      Spouse name: Not on file   • Number of children: Not on file   • Years of education: Not on file   • Highest education level: Not on file   Occupational History   • Not on file   Social Needs   • Financial resource strain: Not on file   • Food insecurity     Worry: Not on file     Inability: Not on file   • Transportation needs     Medical: Not on file     Non-medical: Not on file   Tobacco Use   • Smoking status: Never Smoker   • Smokeless tobacco: Never Used   Substance and Sexual Activity   • Alcohol use: Yes     Alcohol/week: 0.6 oz     Types: 1 Glasses of wine per week     Comment: 2 per month   • Drug use: No   • Sexual activity: Yes     Partners: Male   Lifestyle   • Physical activity     Days per week: Not on file     Minutes per session: Not on file   • Stress: Not on file  "  Relationships   • Social connections     Talks on phone: Not on file     Gets together: Not on file     Attends Synagogue service: Not on file     Active member of club or organization: Not on file     Attends meetings of clubs or organizations: Not on file     Relationship status: Not on file   • Intimate partner violence     Fear of current or ex partner: Not on file     Emotionally abused: Not on file     Physically abused: Not on file     Forced sexual activity: Not on file   Other Topics Concern   • Not on file   Social History Narrative   • Not on file       Objective:     Vitals: /78 (BP Location: Right arm, Patient Position: Sitting, BP Cuff Size: Large adult)   Pulse 66   Temp 37.2 °C (99 °F)   Ht 1.702 m (5' 7\")   Wt 88 kg (194 lb)   SpO2 97%   BMI 30.38 kg/m²    General: Alert, pleasant, NAD  HEENT: Normocephalic.    Skin: Warm, dry, no rashes.  Extremities: No leg edema. No discoloration  Neurological: No tremors  Psych:  Affect/mood is normal, judgement is good, memory is intact, grooming is appropriate.    Assessment/Plan:     Samantha was seen today for hospital follow-up, nephrolithiasis and ovarian cyst.    Diagnoses and all orders for this visit:    Ureterolithiasis  New for the patient.  Have reviewed his CT scan and labs from the ER visit.  Patient is afebrile at this time, hemodynamically stable.  No hydronephrosis present.  Patient does have a 3 mm sized stone present at the right ureterovesical junction.  Recommend patient obtain a strainer down from the lab.  Continue hydration avoid being sedentary.  Emergent precautions discussed.    Acute right flank pain  Secondary to her ureterolithiasis-was given Norco in the emergency room.  Recommend NSAIDs, warm bath.  Emergent precautions discussed.  Patient is understanding of this.    Nausea  -     ondansetron (ZOFRAN) 4 MG Tab tablet; Take 1 Tab by mouth every four hours as needed for Nausea/Vomiting.    Right ovarian cyst  New finding. "  We will follow-up in approximately 1 month regarding this.  Emergent precautions discussed.  Have discussed with ultrasound for further evaluation as well as possible referral to gynecology.        Return for follow up with ovarian cyst.          Amalia SMILEY.

## 2020-09-29 NOTE — PATIENT INSTRUCTIONS
Kidney Stones    Kidney stones (urolithiasis) are rock-like masses that form inside of the kidneys. Kidneys are organs that make pee (urine). A kidney stone can cause very bad pain and can block the flow of pee. The stone usually leaves your body (passes) through your pee. You may need to have a doctor take out the stone.  Follow these instructions at home:  Eating and drinking  · Drink enough fluid to keep your pee clear or pale yellow. This will help you pass the stone.  · If told by your doctor, change the foods you eat (your diet). This may include:  ? Limiting how much salt (sodium) you eat.  ? Eating more fruits and vegetables.  ? Limiting how much meat, poultry, fish, and eggs you eat.  · Follow instructions from your doctor about eating or drinking restrictions.  General instructions  · Collect pee samples as told by your doctor. You may need to collect a pee sample:  ? 24 hours after a stone comes out.  ? 8-12 weeks after a stone comes out, and every 6-12 months after that.  · Strain your pee every time you pee (urinate), for as long as told. Use the strainer that your doctor recommends.  · Do not throw out the stone. Keep it so that it can be tested by your doctor.  · Take over-the-counter and prescription medicines only as told by your doctor.  · Keep all follow-up visits as told by your doctor. This is important. You may need follow-up tests.  Preventing kidney stones  To prevent another kidney stone:  · Drink enough fluid to keep your pee clear or pale yellow. This is the best way to prevent kidney stones.  · Eat healthy foods.  · Avoid certain foods as told by your doctor. You may be told to eat less protein.  · Stay at a healthy weight.  Contact a doctor if:  · You have pain that gets worse or does not get better with medicine.  Get help right away if:  · You have a fever or chills.  · You get very bad pain.  · You get new pain in your belly (abdomen).  · You pass out (faint).  · You cannot pee.  This  information is not intended to replace advice given to you by your health care provider. Make sure you discuss any questions you have with your health care provider.  Document Released: 06/05/2009 Document Revised: 07/30/2019 Document Reviewed: 09/05/2017  Elsevier Patient Education © 2020 [employer]

## 2021-03-15 DIAGNOSIS — Z23 NEED FOR VACCINATION: ICD-10-CM

## 2021-09-07 ENCOUNTER — OFFICE VISIT (OUTPATIENT)
Dept: MEDICAL GROUP | Facility: MEDICAL CENTER | Age: 66
End: 2021-09-07
Payer: COMMERCIAL

## 2021-09-07 VITALS
WEIGHT: 200 LBS | DIASTOLIC BLOOD PRESSURE: 74 MMHG | HEART RATE: 85 BPM | BODY MASS INDEX: 31.39 KG/M2 | HEIGHT: 67 IN | TEMPERATURE: 98.5 F | OXYGEN SATURATION: 95 % | SYSTOLIC BLOOD PRESSURE: 112 MMHG

## 2021-09-07 DIAGNOSIS — Z12.31 ENCOUNTER FOR SCREENING MAMMOGRAM FOR BREAST CANCER: ICD-10-CM

## 2021-09-07 DIAGNOSIS — Z12.12 SCREENING FOR COLORECTAL CANCER: ICD-10-CM

## 2021-09-07 DIAGNOSIS — Z12.11 SCREENING FOR COLORECTAL CANCER: ICD-10-CM

## 2021-09-07 DIAGNOSIS — E78.5 DYSLIPIDEMIA: Chronic | ICD-10-CM

## 2021-09-07 DIAGNOSIS — M25.551 RIGHT HIP PAIN: ICD-10-CM

## 2021-09-07 DIAGNOSIS — Z85.6: Chronic | ICD-10-CM

## 2021-09-07 DIAGNOSIS — N83.201 RIGHT OVARIAN CYST: ICD-10-CM

## 2021-09-07 DIAGNOSIS — Z78.0 POSTMENOPAUSAL: ICD-10-CM

## 2021-09-07 DIAGNOSIS — E66.9 OBESITY (BMI 30-39.9): ICD-10-CM

## 2021-09-07 PROCEDURE — 99214 OFFICE O/P EST MOD 30 MIN: CPT | Performed by: NURSE PRACTITIONER

## 2021-09-07 ASSESSMENT — FIBROSIS 4 INDEX: FIB4 SCORE: 0.62

## 2021-09-07 NOTE — PROGRESS NOTES
"Chief Complaint   Patient presents with   • Hip Pain     dull to sharp pain in right hip area x 1 month        Subjective:     HPI:     Samantha Yu is a 66 y.o. female here to discuss the evaluation and management of:    Right Hip Pain  Having right lateral hip pain for about one month. No fall or injury.   No back pain. When walking can be sharp pain, when laying down can be a dull pain. Her hip has bothered her for years but not this consistent. Now over the last month even swimming hurts.  Denies overt back pain.    Right ovarian cyst  Known problem. Has not been bothersome for her until recently. Had previously recommend U/S and referral to GYN for further evaluation and recommendation.    She is amenable to having further work-up for this.    Hx of acute myeloid leukemia in remission  HPI :  History of myelodysplastic leukemia, and had a bone marrow transplant from her sister in 2002. She was cared for in [place] during this time. Since she's moved to [place] 2007 she has not followed up regularly with an oncologist. States she's she has done so one time and was told she was in remission and has since routinely been monitored. Does not recall the name of the oncologist. She states she was told \"she had an impressive bone scan \" from the oncologist     Dyslipidemia  Not on statin therapy.    ROS:  Denies any Headache, Blurred Vision, Confusion, Chest pain,  Shortness of breath,  Abdominal pain, Changes of bowel or bladder, Lower ext edema, Fevers, Nights sweats, Weight Changes, Focal weakness or numbness.  And all other systems reviewed and are all negative. POSITIVE FOR : see above        Current Outpatient Medications:   •  HYDROcodone-acetaminophen (NORCO) 5-325 MG Tab per tablet, Take 1 Tab by mouth every 6 hours as needed., Disp: , Rfl:   •  ondansetron (ZOFRAN) 4 MG Tab tablet, Take 1 Tab by mouth every four hours as needed for Nausea/Vomiting., Disp: 20 Tab, Rfl: 1  •  travoprost (TRAVATAN Z) 0.004 " % Solution, Place 1 Drop in both eyes every day., Disp: , Rfl:     Allergies   Allergen Reactions   • Sulfa Drugs      hives       Past Medical History:   Diagnosis Date   • Cataract 12/2018    Bilateral - early stages, no surgery   • Fatty liver    • Glaucoma     Bilateral   • Heart burn    • Hemorrhagic disorder (HCC) 12/2018    AML leukemia, dx 2001   • High cholesterol     No meds   • History of anemia 12/2018    Not an issue now   • Murmur    • Seasonal allergies    • Urinary incontinence     With coughing, sneezing     Past Surgical History:   Procedure Laterality Date   • FOOT ORIF Right 12/27/2018    Procedure: FOOT ORIF-  METATARSAL;  Surgeon: Wm Richie Amado M.D.;  Location: SURGERY [facility];  Service: Orthopedics   • EYE SURGERY Bilateral 2015    d/t pressure in eye from glaucoma   • OTHER  05/2002    Bone marrow transplant   • TUBAL LIGATION  1996     Family History   Problem Relation Age of Onset   • Cancer Mother         breast   • Cancer Maternal Grandmother         breast   • Heart Attack Paternal Grandfather    • Diabetes Paternal Grandfather      Social History     Socioeconomic History   • Marital status:      Spouse name: Not on file   • Number of children: Not on file   • Years of education: Not on file   • Highest education level: Not on file   Occupational History   • Not on file   Tobacco Use   • Smoking status: Never Smoker   • Smokeless tobacco: Never Used   Substance and Sexual Activity   • Alcohol use: Yes     Alcohol/week: 0.6 oz     Types: 1 Glasses of wine per week     Comment: 2 per month   • Drug use: No   • Sexual activity: Yes     Partners: Male   Other Topics Concern   • Not on file   Social History Narrative   • Not on file     Social Determinants of Health     Financial Resource Strain:    • Difficulty of Paying Living Expenses:    Food Insecurity:    • Worried About Running Out of Food in the Last Year:    • Ran Out of Food in the Last Year:    Transportation  "Needs:    • Lack of Transportation (Medical):    • Lack of Transportation (Non-Medical):    Physical Activity:    • Days of Exercise per Week:    • Minutes of Exercise per Session:    Stress:    • Feeling of Stress :    Social Connections:    • Frequency of Communication with Friends and Family:    • Frequency of Social Gatherings with Friends and Family:    • Attends [employer] Services:    • Active Member of Clubs or Organizations:    • Attends Club or Organization Meetings:    • Marital Status:    Intimate Partner Violence:    • Fear of Current or Ex-Partner:    • Emotionally Abused:    • Physically Abused:    • Sexually Abused:        Objective:     Vitals: /74 (BP Location: Right arm, Patient Position: Sitting, BP Cuff Size: Adult)   Pulse 85   Temp 36.9 °C (98.5 °F) (Temporal)   Ht 1.702 m (5' 7\")   Wt 90.7 kg (200 lb)   SpO2 95%   BMI 31.32 kg/m²    General: Alert, pleasant, NAD  HEENT: Normocephalic.  Neck supple.   Respiratory: no distress, no audible wheezing, RR -WNL  Skin: Warm, dry, no rashes.  Extremities: No leg edema. No discoloration  Neurological: No tremors  Psych:  Affect/mood is normal, judgement is good, memory is intact, grooming is appropriate.    Assessment/Plan:     Samantha was seen today for hip pain.    Diagnoses and all orders for this visit:    Right hip pain  Acute, stable.  Recommend obtaining ultrasound for baseline as well as physical therapy.  Have discussed possible differentials including trochanter bursitis.  - : DX-HIP-UNILATERAL-WITH PELVIS-1 VIEW RIGHT; Future  -     REFERRAL TO PHYSICAL THERAPY    Right ovarian cyst  -     REFERRAL TO GYNECOLOGY  -     US-PELVIC COMPLETE (TRANSABDOMINAL/TRANSVAGINAL) (COMBO); Future    Hx of acute myeloid leukemia in remission  Denies any abnormal weight loss, night sweats. Update labs.  -     CBC WITH DIFFERENTIAL; Future  -     Comp Metabolic Panel; Future    Obesity (BMI 30-39.9)  Chronic. Patient encouraged to reduce excess " calorie consumption. Encouraged regular exercise. Discussed long term sequelae of obesity.     -     Patient identified as having weight management issue.  Appropriate orders and counseling given.    Dyslipidemia  Not on statin therapy.  Will recheck lipid panel.  -     Lipid Profile; Future  -     TSH WITH REFLEX TO FT4; Future    Postmenopausal  -     DS-BONE DENSITY STUDY (DEXA); Future    Encounter for screening mammogram for breast cancer  -     MA-SCREENING MAMMO BILAT W/TOMOSYNTHESIS W/CAD; Future    Screening for colorectal cancer  -     REFERRAL TO GI FOR COLONOSCOPY        Return in about 8 weeks (around 11/2/2021).          Amalia SMILEY.

## 2021-09-08 ENCOUNTER — HOSPITAL ENCOUNTER (OUTPATIENT)
Dept: RADIOLOGY | Facility: MEDICAL CENTER | Age: 66
End: 2021-09-08
Attending: NURSE PRACTITIONER
Payer: COMMERCIAL

## 2021-09-08 DIAGNOSIS — M25.551 RIGHT HIP PAIN: ICD-10-CM

## 2021-09-08 PROBLEM — Z94.6: Chronic | Status: ACTIVE | Noted: 2017-11-02

## 2021-09-08 PROCEDURE — 73502 X-RAY EXAM HIP UNI 2-3 VIEWS: CPT | Mod: RT

## 2021-09-29 ENCOUNTER — HOSPITAL ENCOUNTER (OUTPATIENT)
Dept: RADIOLOGY | Facility: MEDICAL CENTER | Age: 66
End: 2021-09-29
Attending: NURSE PRACTITIONER
Payer: COMMERCIAL

## 2021-09-29 DIAGNOSIS — N83.201 RIGHT OVARIAN CYST: ICD-10-CM

## 2021-09-29 PROCEDURE — 76830 TRANSVAGINAL US NON-OB: CPT

## 2021-10-04 ENCOUNTER — HOSPITAL ENCOUNTER (OUTPATIENT)
Dept: LAB | Facility: MEDICAL CENTER | Age: 66
End: 2021-10-04
Attending: NURSE PRACTITIONER
Payer: COMMERCIAL

## 2021-10-04 DIAGNOSIS — E78.5 DYSLIPIDEMIA: Chronic | ICD-10-CM

## 2021-10-04 DIAGNOSIS — Z85.6: Chronic | ICD-10-CM

## 2021-10-04 LAB
ALBUMIN SERPL BCP-MCNC: 4.3 G/DL (ref 3.2–4.9)
ALBUMIN/GLOB SERPL: 1.2 G/DL
ALP SERPL-CCNC: 93 U/L (ref 30–99)
ALT SERPL-CCNC: 42 U/L (ref 2–50)
ANION GAP SERPL CALC-SCNC: 11 MMOL/L (ref 7–16)
AST SERPL-CCNC: 29 U/L (ref 12–45)
BASOPHILS # BLD AUTO: 0.9 % (ref 0–1.8)
BASOPHILS # BLD: 0.08 K/UL (ref 0–0.12)
BILIRUB SERPL-MCNC: 0.4 MG/DL (ref 0.1–1.5)
BUN SERPL-MCNC: 19 MG/DL (ref 8–22)
CALCIUM SERPL-MCNC: 10 MG/DL (ref 8.5–10.5)
CHLORIDE SERPL-SCNC: 106 MMOL/L (ref 96–112)
CHOLEST SERPL-MCNC: 234 MG/DL (ref 100–199)
CO2 SERPL-SCNC: 26 MMOL/L (ref 20–33)
CREAT SERPL-MCNC: 0.59 MG/DL (ref 0.5–1.4)
EOSINOPHIL # BLD AUTO: 0.17 K/UL (ref 0–0.51)
EOSINOPHIL NFR BLD: 2 % (ref 0–6.9)
ERYTHROCYTE [DISTWIDTH] IN BLOOD BY AUTOMATED COUNT: 54.7 FL (ref 35.9–50)
FASTING STATUS PATIENT QL REPORTED: NORMAL
GLOBULIN SER CALC-MCNC: 3.6 G/DL (ref 1.9–3.5)
GLUCOSE SERPL-MCNC: 101 MG/DL (ref 65–99)
HCT VFR BLD AUTO: 46.6 % (ref 37–47)
HDLC SERPL-MCNC: 75 MG/DL
HGB BLD-MCNC: 15.4 G/DL (ref 12–16)
IMM GRANULOCYTES # BLD AUTO: 0.01 K/UL (ref 0–0.11)
IMM GRANULOCYTES NFR BLD AUTO: 0.1 % (ref 0–0.9)
LDLC SERPL CALC-MCNC: 141 MG/DL
LYMPHOCYTES # BLD AUTO: 2.87 K/UL (ref 1–4.8)
LYMPHOCYTES NFR BLD: 33.4 % (ref 22–41)
MCH RBC QN AUTO: 32.5 PG (ref 27–33)
MCHC RBC AUTO-ENTMCNC: 33 G/DL (ref 33.6–35)
MCV RBC AUTO: 98.3 FL (ref 81.4–97.8)
MONOCYTES # BLD AUTO: 0.94 K/UL (ref 0–0.85)
MONOCYTES NFR BLD AUTO: 11 % (ref 0–13.4)
NEUTROPHILS # BLD AUTO: 4.51 K/UL (ref 2–7.15)
NEUTROPHILS NFR BLD: 52.6 % (ref 44–72)
NRBC # BLD AUTO: 0 K/UL
NRBC BLD-RTO: 0 /100 WBC
PLATELET # BLD AUTO: 431 K/UL (ref 164–446)
PMV BLD AUTO: 9.6 FL (ref 9–12.9)
POTASSIUM SERPL-SCNC: 4.2 MMOL/L (ref 3.6–5.5)
PROT SERPL-MCNC: 7.9 G/DL (ref 6–8.2)
RBC # BLD AUTO: 4.74 M/UL (ref 4.2–5.4)
SODIUM SERPL-SCNC: 143 MMOL/L (ref 135–145)
TRIGL SERPL-MCNC: 90 MG/DL (ref 0–149)
TSH SERPL DL<=0.005 MIU/L-ACNC: 3.41 UIU/ML (ref 0.38–5.33)
WBC # BLD AUTO: 8.6 K/UL (ref 4.8–10.8)

## 2021-10-04 PROCEDURE — 84443 ASSAY THYROID STIM HORMONE: CPT

## 2021-10-04 PROCEDURE — 80053 COMPREHEN METABOLIC PANEL: CPT

## 2021-10-04 PROCEDURE — 85025 COMPLETE CBC W/AUTO DIFF WBC: CPT

## 2021-10-04 PROCEDURE — 36415 COLL VENOUS BLD VENIPUNCTURE: CPT

## 2021-10-04 PROCEDURE — 80061 LIPID PANEL: CPT

## 2021-10-18 ENCOUNTER — GYNECOLOGY VISIT (OUTPATIENT)
Dept: OBGYN | Facility: CLINIC | Age: 66
End: 2021-10-18
Payer: COMMERCIAL

## 2021-10-18 VITALS
HEIGHT: 66 IN | BODY MASS INDEX: 32.3 KG/M2 | DIASTOLIC BLOOD PRESSURE: 82 MMHG | WEIGHT: 201 LBS | SYSTOLIC BLOOD PRESSURE: 126 MMHG

## 2021-10-18 DIAGNOSIS — N83.201 RIGHT OVARIAN CYST: Primary | ICD-10-CM

## 2021-10-18 PROCEDURE — 99203 OFFICE O/P NEW LOW 30 MIN: CPT | Performed by: OBSTETRICS & GYNECOLOGY

## 2021-10-18 ASSESSMENT — FIBROSIS 4 INDEX: FIB4 SCORE: 0.66

## 2021-10-18 NOTE — PROGRESS NOTES
New GYN Problem Note    CC:   Ovarian Cyst (Right)      HPI:   Patient is a 66 y.o.  who presents for consult of right ovarian cyst.  States that cyst was identified 1 year ago on CT for kidney stone.  In August of this year she had a two-week bout of intermittent sharp RLQ pain that prompted repeat eval. US was ordered which showed simple right ovarian cyst of the roughly same size as previously seen on CT.  She continues to have intermittent, short lasting sharp pain on the right but less frequent and feels it may also be contributed by a bone spur in the hip. She would like to discuss US results and whether there is a concern.      She has a personal history of AML treated by BMT and an extensive family history of breast cancer (mother, grandmother and aunt x2) so things like this always get her worried.      GYNECOLOGIC HISTORY:   Current Sexual Activity: no  History of sexually transmitted diseases? No  Abnormal discharge? No     Menstrual History  Patient's last menstrual period was No LMP recorded. Patient is postmenopausal.       Pap History  Last Pap: 2019 - wnl  Hx Moderate or Severe Dysplasia : No     Sexually active:    Social History     Substance and Sexual Activity   Sexual Activity Yes   • Partners: Male   • Birth control/protection: Surgical       OBSTETRIC HISTORY:  OB History    Para Term  AB Living   2 2 2     2   SAB TAB Ectopic Molar Multiple Live Births             2      # Outcome Date GA Lbr Yohan/2nd Weight Sex Delivery Anes PTL Lv   2 Term 92 40w0d   M Vag-Spont None N MAIDA   1 Term 85 40w0d   M Vag-Spont None N MAIDA       MEDICAL HISTORY:  Past Medical History:   Diagnosis Date   • Allergy    • Cataract 2018    Bilateral - early stages, no surgery   • Fatty liver    • Glaucoma     Bilateral   • Heart burn    • Hemorrhagic disorder (HCC) 2018    AML leukemia, dx    • High cholesterol     No meds   • History of anemia 2018    Not an issue now   •  "Murmur    • Seasonal allergies    • Urinary incontinence     With coughing, sneezing       SURGICAL HISTORY:  Past Surgical History:   Procedure Laterality Date   • FOOT ORIF Right 12/27/2018    Procedure: FOOT ORIF-  METATARSAL;  Surgeon: Wm Richie Amado M.D.;  Location: SURGERY [facility];  Service: Orthopedics   • EYE SURGERY Bilateral 2015    d/t pressure in eye from glaucoma   • OTHER  05/2002    Bone marrow transplant   • TUBAL LIGATION  1996       FAMILY HISTORY:  Family History   Problem Relation Age of Onset   • Cancer Mother         breast   • Cancer Maternal Grandmother         breast   • Heart Attack Paternal Grandfather    • Diabetes Paternal Grandfather        ALLERGIES / REACTIONS:  Allergies   Allergen Reactions   • Sulfa Drugs      hives       SOCIAL HISTORY:   reports that she has never smoked. She has never used smokeless tobacco. She reports current alcohol use of about 0.6 oz of alcohol per week. She reports that she does not use drugs.    ROS:   Positive ROS: none  Gen: no fevers or chills, no significant weight loss or gain, excessive fatigue  Respiratory:  no cough or dyspnea  Cardiac:  no chest pain, no palpitations, no syncope  Breast: no breast discharge, pain, lump or skin changes  GI:  no heartburn, no abdominal pain, no nausea or vomiting  Urinary: no dysuria, urgency, frequency, incontinence   Psych: no depression or anxiety  Neuro: no migraines with aura, fainting spells, numbness or tingling  Extremities: no joint pain, persistently swollen ankles, recurrent leg cramps       PHYSICAL EXAMINATION:  Vital Signs:   Vitals:    10/18/21 1554   BP: 126/82   BP Location: Right arm   Patient Position: Sitting   BP Cuff Size: Adult   Weight: 91.2 kg (201 lb)   Height: 1.676 m (5' 6\")     Body mass index is 32.44 kg/m².  Constitutional: The patient is well developed and well nourished.  Psychiatric: Patient is oriented to time place and person.   Skin: No rash observed.  Neck: Neck " appears symmetric.  Respiratory: normal effort  Abdomen: Soft, non-tender.  Pelvic Exam:     External female genitalia: normal appearance    Bimanual exam only:         Cervix feels smooth and flush with vagina;          Uterus - non-tender and non-palpable on exam due to abdominal wall tenseness and body habitus; Ovaries likewise non-tender but not appreciable even with cyst  Extremeties: Legs are symmetric and without tenderness.       ASSESSMENT AND PLAN:  66 y.o.       1. Right ovarian cyst   - discussed that stable size over 1 year (roughly since can't compare directly to CT) is reassuring.  In general, even in post-menopausal women, simple cyst 5cm or less is not concerning and can be followed with surveillance.  Recommend US surveillance in 6 mo and again in 6 mo then possibly every year if unchanging.   - unsure/unlikely that her intermittent RLQ is related to cyst given its presence for at least a year, likely more   - continue to follow symptoms but would not recommend surgical management at this time on basis of pain and given benign appearance of cyst, would also no jump to surgical management    - US-PELVIC COMPLETE (TRANSABDOMINAL/TRANSVAGINAL) (COMBO); Future    Follow up in 6 mo after completion of US to discuss symptoms, review US and rpt exam    Pap in  then stop due to age 67    Brie Easton D.O.

## 2021-10-18 NOTE — NON-PROVIDER
Patient here c/o right ovarian cyst   LMP= menopause   BCM:menopause   Last pap date/result:11/16/19 neg   Last mammogram if applicable 10/30/19  Phone number:[phone]  Pharmacy confirmed.

## 2021-10-22 ENCOUNTER — HOSPITAL ENCOUNTER (OUTPATIENT)
Dept: RADIOLOGY | Facility: MEDICAL CENTER | Age: 66
End: 2021-10-22
Attending: NURSE PRACTITIONER
Payer: COMMERCIAL

## 2021-10-22 DIAGNOSIS — Z78.0 POSTMENOPAUSAL: ICD-10-CM

## 2021-10-22 DIAGNOSIS — Z12.31 ENCOUNTER FOR SCREENING MAMMOGRAM FOR BREAST CANCER: ICD-10-CM

## 2021-10-22 PROCEDURE — 77080 DXA BONE DENSITY AXIAL: CPT

## 2021-10-22 PROCEDURE — 77063 BREAST TOMOSYNTHESIS BI: CPT

## 2021-10-25 ENCOUNTER — PHYSICAL THERAPY (OUTPATIENT)
Dept: PHYSICAL THERAPY | Facility: REHABILITATION | Age: 66
End: 2021-10-25
Attending: NURSE PRACTITIONER
Payer: COMMERCIAL

## 2021-10-25 DIAGNOSIS — M25.551 RIGHT HIP PAIN: ICD-10-CM

## 2021-10-25 PROCEDURE — 97110 THERAPEUTIC EXERCISES: CPT

## 2021-10-25 PROCEDURE — 97161 PT EVAL LOW COMPLEX 20 MIN: CPT

## 2021-10-25 ASSESSMENT — ENCOUNTER SYMPTOMS
PAIN SCALE: 0
PAIN SCALE AT LOWEST: 0
PAIN SCALE AT HIGHEST: 6
QUALITY: SHARP
QUALITY: DULL ACHE
QUALITY: ACHING

## 2021-10-25 NOTE — OP THERAPY EVALUATION
Outpatient Physical Therapy  INITIAL EVALUATION    [facility] [place]  [address]  [place] [address]  Phone:  [phone]  Fax:  [phone]    Date of Evaluation: 10/25/2021    Patient: Samantha Yu  YOB: 1955  MRN: [id number]     Referring Provider: KERRI Gillespie [facility] [address]  [place] [id number]   Referring Diagnosis Right hip pain [M25.551]     Time Calculation  Start time: 0815  Stop time: 0900 Time Calculation (min): 45 minutes         Chief Complaint: R hip pain    Visit Diagnoses     ICD-10-CM   1. Right hip pain  M25.551       Date of onset of impairment: 2021 (a few months)    Subjective:   History of Present Illness:     Mechanism of injury:  Samantha Yu is a 66 y.o. female that presents to therapy with R hip pain. She states that symptoms came on with insidious onset. Her pain is located along her lateral posterior hip. Patient denies fevers/chills, numbness/weakness of lower extremities, bowel/bladder incontinence, saddle anesthesia. She has been dealing with R for 3 years s/p fracture of her R foot after a GLF.      Aggravating factors: sitting for too long,standing in line.   Relieving factors: frequent easy movement, supplements.     ADL limitations: limited standing/ walking tolerance.     Pain:     Current pain ratin    At best pain ratin    At worst pain ratin    Quality:  Aching, sharp and dull ache      Past Medical History:   Diagnosis Date   • Allergy    • Cataract 2018    Bilateral - early stages, no surgery   • Fatty liver    • Glaucoma     Bilateral   • Heart burn    • Hemorrhagic disorder (HCC) 2018    AML leukemia, dx 2001   • High cholesterol     No meds   • History of anemia 2018    Not an issue now   • Murmur    • Seasonal allergies    • Urinary incontinence     With coughing, sneezing     Past Surgical History:   Procedure Laterality Date   • FOOT ORIF Right  12/27/2018    Procedure: FOOT ORIF-  METATARSAL;  Surgeon: Wm Richie Amado M.D.;  Location: SURGERY [facility];  Service: Orthopedics   • EYE SURGERY Bilateral 2015    d/t pressure in eye from glaucoma   • OTHER  05/2002    Bone marrow transplant   • TUBAL LIGATION  1996     Social History     Tobacco Use   • Smoking status: Never Smoker   • Smokeless tobacco: Never Used   Substance Use Topics   • Alcohol use: Yes     Alcohol/week: 0.6 oz     Types: 1 Glasses of wine per week     Comment: 2 per month     Family and Occupational History     Socioeconomic History   • Marital status:      Spouse name: Not on file   • Number of children: Not on file   • Years of education: Not on file   • Highest education level: Not on file   Occupational History   • Not on file       Objective     Lumbar Screen  Lumbar range of motion within normal limits.    Neurological Testing     Sensation     Hip   Left Hip   Intact: light touch    Right Hip   Intact: light touch    Additional Neurological Details  DTRs not assessed     Palpation     Additional Palpation Details  No palpable tenderness    Active Range of Motion   Left Hip   Extension: 10 degrees   Abduction: 30 degrees   External rotation (90/90): WFL    Right Hip   Flexion: WFL  Extension: 5 degrees   Abduction: 20 degrees   External rotation (90/90): WFL    Passive Range of Motion   Left Hip   Extension: 20 degrees   Abduction: 30 degrees     Right Hip   Extension: 5 degrees   Abduction: 20 degrees     Strength:      Left Hip   Planes of Motion   Flexion: 4  Extension: 4+  Abduction: 4+  Adduction: 4  External rotation: 4+  Internal rotation: 5    Right Hip   Planes of Motion   Flexion: 4  Extension: 4+  Abduction: 4+  Adduction: 4  External rotation: 4+  Internal rotation: 5    Tests     Left Hip   Neo: Hip flexion: 0. Knee flexion: 90.     Right Hip   Negative CORBY, FADIR, Henok, scour, SI compression and SI distraction.   Neo: Positive. Hip flexion: 10.  Knee flexion: 90.         Therapeutic Exercises (CPT 09652):       Therapeutic Exercise Summary: HEP instruction/performance and development. Handout provided and exercises located below:  Access Code: [email or web address]  URL: [email or web address]  Date: 10/25/2021  Prepared by: Lloyd Sears    Exercises        Supine Hip Internal and External Rotation - 1-2 x daily - 2 sets - 20 reps      Neo Stretch on Table - 1-2 x daily - 2 sets - 30 hold      Clamshell - 1 x daily - 2 sets - 15 reps      Time-based treatments/modalities:    Physical Therapy Timed Treatment Charges  Therapeutic exercise minutes (CPT 55680): 15 minutes      Assessment, Response and Plan:   Assessment details:  Samantha Yu is a 66 y.o. female with signs and symptoms consistent with stiffness secondary to previous joint inflammation/OA. She requires skilled physical therapy intervention to decrease pain, increase range of motion, increase functional mobility, improve ADL completion and establish a home exercise program.  Goals:   Short Term Goals:   1. Patient will be Independent with prescribed Home Exercise Program (HEP) and will be able to demonstrate exercises without cues for improved overall symptoms/activity tolerance.   2. Pt will improve hip extension ROM to neutral or better for improved step length and overall improved joint health.  Short term goal time span:  2-4 weeks      Long Term Goals:    3. Pt will improve ability to  line or kitchen without increased pain >1/10 for > 5minutes.   4. Pt will improve LEFS score to greater than 60/80 indicative of improved function and reduced perceived disability.  Long term goal time span:  4-6 weeks    Plan:   Planned therapy interventions:  Manual Therapy (CPT 07310), Neuromuscular Re-education (CPT 36999), E Stim Unattended (CPT 15432) and Therapeutic Exercise (CPT 83351)  Frequency: 1-2x/week.  Duration in weeks:  6  Discussed with:  Patient    Functional Assessment Used  PT  Functional Assessment Tool Used: LEFS  PT Functional Assessment Score: 55/80     Referring provider co-signature:  I have reviewed this plan of care and my co-signature certifies the need for services.    Certification Period: 10/25/2021 to  12/06/21    Physician Signature: ________________________________ Date: ______________

## 2021-11-01 ENCOUNTER — PHYSICAL THERAPY (OUTPATIENT)
Dept: PHYSICAL THERAPY | Facility: REHABILITATION | Age: 66
End: 2021-11-01
Attending: NURSE PRACTITIONER
Payer: COMMERCIAL

## 2021-11-01 DIAGNOSIS — M25.551 RIGHT HIP PAIN: ICD-10-CM

## 2021-11-01 PROCEDURE — 97110 THERAPEUTIC EXERCISES: CPT

## 2021-11-01 NOTE — OP THERAPY DAILY TREATMENT
Outpatient Physical Therapy  DAILY TREATMENT     [facility] [place]  [address]  [facility] [address]  Phone:  [phone]  Fax:  [phone]    Date: 11/01/2021    Patient: Samantha Yu  YOB: 1955  MRN: [id number]     Time Calculation    Start time: 0345  Stop time: 0430 Time Calculation (min): 45 minutes         Chief Complaint: hip/ back pain  Visit #: 2    SUBJECTIVE:  Notes the first day of her exercises were really sore. Notes that overall She hasnt hurt much though and reports continued compliance with HEP    OBJECTIVE:  Current objective measures:           Therapeutic Exercises (CPT 68280):     1. Nustep, lvl 3 x 7min    2. Shuttle, 5c x 60     3. Hip switch, 2min hooklying    4. Ball bridges, x 20    5. Supine ball lumbar twist , x 40    6. Kneeling hip flexor stertch      Therapeutic Exercise Summary: HEP instruction/performance and development. Handout provided and exercises located below:  Access Code: [email or web address]  URL: [email or web address]  Date: 10/25/2021  Prepared by: Lloyd Sears    Exercises        Supine Hip Internal and External Rotation - 1-2 x daily - 2 sets - 20 reps      Neo Stretch on Table - 1-2 x daily - 2 sets - 30 hold      Clamshell - 1 x daily - 2 sets - 15 reps      Time-based treatments/modalities:    Physical Therapy Timed Treatment Charges  Therapeutic exercise minutes (CPT 58123): 45 minutes      Pain rating (1-10) before treatment:  0  Pain rating (1-10) after treatment:  0    ASSESSMENT:   Response to treatment: Overall exercises tolerated without pain. Continued hip strengthening indicated. F/u in one week.     PLAN/RECOMMENDATIONS:   Plan for treatment: therapy treatment to continue next visit.  Planned interventions for next visit: continue with current treatment.

## 2021-11-08 ENCOUNTER — PHYSICAL THERAPY (OUTPATIENT)
Dept: PHYSICAL THERAPY | Facility: REHABILITATION | Age: 66
End: 2021-11-08
Attending: NURSE PRACTITIONER
Payer: COMMERCIAL

## 2021-11-08 DIAGNOSIS — M25.551 RIGHT HIP PAIN: ICD-10-CM

## 2021-11-08 PROCEDURE — 97110 THERAPEUTIC EXERCISES: CPT

## 2021-11-08 NOTE — OP THERAPY DAILY TREATMENT
Outpatient Physical Therapy  DAILY TREATMENT     [facility] [place]  [address]  [facility] [address]  Phone:  [phone]  Fax:  [phone]    Date: 11/08/2021    Patient: Samantha Yu  YOB: 1955  MRN: [id number]     Time Calculation    Start time: 1505  Stop time: 1545 Time Calculation (min): 40 minutes         Chief Complaint: hip/ back pain  Visit #: 3    SUBJECTIVE:  No specific reported changes. Reports compliance with HEP and notes she has been looking at other hip flexor exercises.     OBJECTIVE:  Current objective measures:           Therapeutic Exercises (CPT 13698):     1. Nustep, lvl 3 x 7min    2. Shuttle, Sidelying hip flexion 4c x 25,      3. Hip switch, 2min hooklying    4. Ball bridges, x20    5. Supine ball lumbar twist , x40    6. Kneeling hip flexor stretch, 2min    7. SLR band assisted , x20 purple band  (given for HEP at home)    8. Supine clamshell, purple band x 40      Therapeutic Exercise Summary: HEP instruction/performance and development. Handout provided and exercises located below:  Access Code: [email or web address]  URL: [email or web address]  Date: 10/25/2021  Prepared by: Lloyd Sears    Exercises        Supine Hip Internal and External Rotation - 1-2 x daily - 2 sets - 20 reps      Neo Stretch on Table - 1-2 x daily - 2 sets - 30 hold      Clamshell - 1 x daily - 2 sets - 15 reps  SLR band assisted 1x10- 2x daily      Time-based treatments/modalities:    Physical Therapy Timed Treatment Charges  Therapeutic exercise minutes (CPT 82189): 40 minutes      Pain rating (1-10) before treatment:  0  Pain rating (1-10) after treatment:  0    ASSESSMENT:   Response to treatment: Overall exercises tolerated without pain. Continued hip strengthening indicated. F/u in one week.     PLAN/RECOMMENDATIONS:   Plan for treatment: therapy treatment to continue next visit.  Planned interventions for next visit: continue with current treatment.

## 2021-11-11 ENCOUNTER — OFFICE VISIT (OUTPATIENT)
Dept: MEDICAL GROUP | Facility: MEDICAL CENTER | Age: 66
End: 2021-11-11
Payer: COMMERCIAL

## 2021-11-11 VITALS
SYSTOLIC BLOOD PRESSURE: 118 MMHG | HEIGHT: 66 IN | DIASTOLIC BLOOD PRESSURE: 80 MMHG | TEMPERATURE: 98.1 F | OXYGEN SATURATION: 95 % | BODY MASS INDEX: 32.3 KG/M2 | WEIGHT: 201 LBS | HEART RATE: 92 BPM

## 2021-11-11 DIAGNOSIS — N83.201 RIGHT OVARIAN CYST: Chronic | ICD-10-CM

## 2021-11-11 DIAGNOSIS — E66.9 OBESITY (BMI 30-39.9): ICD-10-CM

## 2021-11-11 DIAGNOSIS — E78.5 DYSLIPIDEMIA: Chronic | ICD-10-CM

## 2021-11-11 DIAGNOSIS — Z23 NEED FOR SHINGLES VACCINE: ICD-10-CM

## 2021-11-11 DIAGNOSIS — R73.01 IFG (IMPAIRED FASTING GLUCOSE): ICD-10-CM

## 2021-11-11 PROBLEM — Z80.3 FAMILY HISTORY OF BREAST CANCER: Chronic | Status: ACTIVE | Noted: 2021-11-11

## 2021-11-11 PROBLEM — Z80.3 FAMILY HISTORY OF BREAST CANCER: Status: ACTIVE | Noted: 2021-11-11

## 2021-11-11 PROCEDURE — 90750 HZV VACC RECOMBINANT IM: CPT | Performed by: NURSE PRACTITIONER

## 2021-11-11 PROCEDURE — 99213 OFFICE O/P EST LOW 20 MIN: CPT | Mod: 25 | Performed by: NURSE PRACTITIONER

## 2021-11-11 PROCEDURE — 90471 IMMUNIZATION ADMIN: CPT | Performed by: NURSE PRACTITIONER

## 2021-11-11 ASSESSMENT — PATIENT HEALTH QUESTIONNAIRE - PHQ9: CLINICAL INTERPRETATION OF PHQ2 SCORE: 0

## 2021-11-11 ASSESSMENT — FIBROSIS 4 INDEX: FIB4 SCORE: 0.66

## 2021-11-11 NOTE — PATIENT INSTRUCTIONS
*Due for second and final dose of Shingles vaccine (Shingrex) in 2-6 months.       Dyslipidemia  Dyslipidemia is an imbalance of waxy, fat-like substances (lipids) in the blood. The body needs lipids in small amounts. Dyslipidemia often involves a high level of cholesterol or triglycerides, which are types of lipids.  Common forms of dyslipidemia include:  · High levels of LDL cholesterol. LDL is the type of cholesterol that causes fatty deposits (plaques) to build up in the blood vessels that carry blood away from your heart (arteries).  · Low levels of HDL cholesterol. HDL cholesterol is the type of cholesterol that protects against heart disease. High levels of HDL remove the LDL buildup from arteries.  · High levels of triglycerides. Triglycerides are a fatty substance in the blood that is linked to a buildup of plaques in the arteries.  What are the causes?  Primary dyslipidemia is caused by changes (mutations) in genes that are passed down through families (inherited). These mutations cause several types of dyslipidemia.  Secondary dyslipidemia is caused by lifestyle choices and diseases that lead to dyslipidemia, such as:  · Eating a diet that is high in animal fat.  · Not getting enough exercise.  · Having diabetes, kidney disease, liver disease, or thyroid disease.  · Drinking large amounts of alcohol.  · Using certain medicines.  What increases the risk?  You are more likely to develop this condition if you are an older man or if you are a woman who has gone through menopause. Other risk factors include:  · Having a family history of dyslipidemia.  · Taking certain medicines, including birth control pills, steroids, some diuretics, and beta-blockers.  · Smoking cigarettes.  · Eating a high-fat diet.  · Having certain medical conditions such as diabetes, polycystic ovary syndrome (PCOS), kidney disease, liver disease, or hypothyroidism.  · Not exercising regularly.  · Being overweight or obese with too much  belly fat.  What are the signs or symptoms?  In most cases, dyslipidemia does not usually cause any symptoms.  In severe cases, very high lipid levels can cause:  · Fatty bumps under the skin (xanthomas).  · White or gray ring around the black center (pupil) of the eye.  Very high triglyceride levels can cause inflammation of the pancreas (pancreatitis).  How is this diagnosed?  Your health care provider may diagnose dyslipidemia based on a routine blood test (fasting blood test). Because most people do not have symptoms of the condition, this blood testing (lipid profile) is done on adults age 20 and older and is repeated every 5 years. This test checks:  · Total cholesterol. This measures the total amount of cholesterol in your blood, including LDL cholesterol, HDL cholesterol, and triglycerides. A healthy number is below 200.  · LDL cholesterol. The target number for LDL cholesterol is different for each person, depending on individual risk factors. Ask your health care provider what your LDL cholesterol should be.  · HDL cholesterol. An HDL level of 60 or higher is best because it helps to protect against heart disease. A number below 40 for men or below 50 for women increases the risk for heart disease.  · Triglycerides. A healthy triglyceride number is below 150.  If your lipid profile is abnormal, your health care provider may do other blood tests.  How is this treated?  Treatment depends on the type of dyslipidemia that you have and your other risk factors for heart disease and stroke. Your health care provider will have a target range for your lipid levels based on this information.  For many people, this condition may be treated by lifestyle changes, such as diet and exercise. Your health care provider may recommend that you:  · Get regular exercise.  · Make changes to your diet.  · Quit smoking if you smoke.  If diet changes and exercise do not help you reach your goals, your health care provider may also  prescribe medicine to lower lipids. The most commonly prescribed type of medicine lowers your LDL cholesterol (statin drug). If you have a high triglyceride level, your provider may prescribe another type of drug (fibrate) or an omega-3 fish oil supplement, or both.  Follow these instructions at home:    Eating and drinking  · Follow instructions from your health care provider or dietitian about eating or drinking restrictions.  · Eat a healthy diet as told by your health care provider. This can help you reach and maintain a healthy weight, lower your LDL cholesterol, and raise your HDL cholesterol. This may include:  ? Limiting your calories, if you are overweight.  ? Eating more fruits, vegetables, whole grains, fish, and lean meats.  ? Limiting saturated fat, trans fat, and cholesterol.  · If you drink alcohol:  ? Limit how much you use.  ? Be aware of how much alcohol is in your drink. In the [place] one drink equals one 12 oz bottle of beer (355 mL), one 5 oz glass of wine (148 mL), or one 1½ oz glass of hard liquor (44 mL).  · Do not drink alcohol if:  ? Your health care provider tells you not to drink.  ? You are pregnant, may be pregnant, or are planning to become pregnant.  Activity  · Get regular exercise. Start an exercise and strength training program as told by your health care provider. Ask your health care provider what activities are safe for you. Your health care provider may recommend:  ? 30 minutes of aerobic activity 4-6 days a week. Brisk walking is an example of aerobic activity.  ? Strength training 2 days a week.  General instructions  · Do not use any products that contain nicotine or tobacco, such as cigarettes, e-cigarettes, and chewing tobacco. If you need help quitting, ask your health care provider.  · Take over-the-counter and prescription medicines only as told by your health care provider. This includes supplements.  · Keep all follow-up visits as told by your health care  provider.  Contact a health care provider if:  · You are:  ? Having trouble sticking to your exercise or diet plan.  ? Struggling to quit smoking or control your use of alcohol.  Summary  · Dyslipidemia often involves a high level of cholesterol or triglycerides, which are types of lipids.  · Treatment depends on the type of dyslipidemia that you have and your other risk factors for heart disease and stroke.  · For many people, treatment starts with lifestyle changes, such as diet and exercise.  · Your health care provider may prescribe medicine to lower lipids.  This information is not intended to replace advice given to you by your health care provider. Make sure you discuss any questions you have with your health care provider.  Document Released: 12/23/2014 Document Revised: 08/12/2019 Document Reviewed: 07/19/2019  Elsevier Patient Education © 2020 [employer] Inc[employer]

## 2021-11-11 NOTE — PROGRESS NOTES
Chief Complaint   Patient presents with   • Dyslipidemia     2 MTH Fv       Subjective:     HPI:     Samantha Yu is a 66 y.o. female   here to discuss the evaluation and management of:     IFG  Recent labs with fasting glucose at 101.  Denies symptoms of hyperglycemia.     Dyslipidemia  LDL has increased since last lipid panel. She is not interested in statin therapy. Based on ASCVD risk, lifestyle modification continues to be recommended. Family hx with paternal grandfather with MI, not sure about her father-he is estranged.    The 10-year ASCVD risk score (Goodspringmeg HARPER Jr., et al., 2013) is: 4.1%    Values used to calculate the score:      Age: 66 years      Sex: Female      Is Non- : No      Diabetic: No      Tobacco smoker: No      Systolic Blood Pressure: 118 mmHg      Is BP treated: No      HDL Cholesterol: 75 mg/dL      Total Cholesterol: 234 mg/dL     Ref. Range 10/4/2021 06:32   Cholesterol,Tot Latest Ref Range: 100 - 199 mg/dL 234 (H)   Triglycerides Latest Ref Range: 0 - 149 mg/dL 90   HDL Latest Ref Range: >=40 mg/dL 75   LDL Latest Ref Range: <100 mg/dL 141 (H)     Right ovarian cyst  Recently followed up with gynecology for this. At this time she will have routine surveillance every 6 months.     ROS:  Denies any Headache, Blurred Vision, Confusion, Chest pain,  Shortness of breath,  Abdominal pain, Changes of bowel or bladder, Lower ext edema, Fevers, Nights sweats, Weight Changes, Focal weakness or numbness.  And all other systems reviewed and are all negative. POSITIVE FOR : see above        Current Outpatient Medications:   •  travoprost (TRAVATAN Z) 0.004 % Solution, Place 1 Drop in both eyes every day., Disp: , Rfl:     Allergies   Allergen Reactions   • Sulfa Drugs      hives       Past Medical History:   Diagnosis Date   • Allergy    • Cataract 12/2018    Bilateral - early stages, no surgery   • Fatty liver    • Glaucoma     Bilateral   • Heart burn    • Hemorrhagic  disorder (HCC) 12/2018    AML leukemia, dx 2001   • High cholesterol     No meds   • History of anemia 12/2018    Not an issue now   • Murmur    • Seasonal allergies    • Urinary incontinence     With coughing, sneezing     Past Surgical History:   Procedure Laterality Date   • FOOT ORIF Right 12/27/2018    Procedure: FOOT ORIF-  METATARSAL;  Surgeon: Wm Richie Amado M.D.;  Location: SURGERY [facility];  Service: Orthopedics   • EYE SURGERY Bilateral 2015    d/t pressure in eye from glaucoma   • OTHER  05/2002    Bone marrow transplant   • TUBAL LIGATION  1996     Family History   Problem Relation Age of Onset   • Cancer Mother         breast   • Hypertension Father    • Cancer Maternal Grandmother         breast   • Heart Attack Paternal Grandfather    • Diabetes Paternal Grandfather    • Cancer Maternal Aunt         breast     Social History     Socioeconomic History   • Marital status:      Spouse name: Not on file   • Number of children: Not on file   • Years of education: Not on file   • Highest education level: Not on file   Occupational History   • Not on file   Tobacco Use   • Smoking status: Never Smoker   • Smokeless tobacco: Never Used   Vaping Use   • Vaping Use: Never used   Substance and Sexual Activity   • Alcohol use: Yes     Alcohol/week: 0.6 oz     Types: 1 Glasses of wine per week     Comment: 2 per month   • Drug use: No   • Sexual activity: Yes     Partners: Male     Birth control/protection: Surgical   Other Topics Concern   • Not on file   Social History Narrative   • Not on file     Social Determinants of Health     Financial Resource Strain:    • Difficulty of Paying Living Expenses: Not on file   Food Insecurity:    • Worried About Running Out of Food in the Last Year: Not on file   • Ran Out of Food in the Last Year: Not on file   Transportation Needs:    • Lack of Transportation (Medical): Not on file   • Lack of Transportation (Non-Medical): Not on file   Physical  "Activity:    • Days of Exercise per Week: Not on file   • Minutes of Exercise per Session: Not on file   Stress:    • Feeling of Stress : Not on file   Social Connections:    • Frequency of Communication with Friends and Family: Not on file   • Frequency of Social Gatherings with Friends and Family: Not on file   • Attends Quaker Services: Not on file   • Active Member of Clubs or Organizations: Not on file   • Attends Club or Organization Meetings: Not on file   • Marital Status: Not on file   Intimate Partner Violence:    • Fear of Current or Ex-Partner: Not on file   • Emotionally Abused: Not on file   • Physically Abused: Not on file   • Sexually Abused: Not on file   Housing Stability:    • Unable to Pay for Housing in the Last Year: Not on file   • Number of Places Lived in the Last Year: Not on file   • Unstable Housing in the Last Year: Not on file       Objective:     Vitals: /80 (BP Location: Right arm, Patient Position: Sitting, BP Cuff Size: Adult)   Pulse 92   Temp 36.7 °C (98.1 °F) (Temporal)   Ht 1.676 m (5' 6\")   Wt 91.2 kg (201 lb)   SpO2 95%   BMI 32.44 kg/m²    General: Alert, pleasant, NAD  HEENT: Normocephalic.  Neck supple.   Respiratory: no distress, no audible wheezing, RR -WNL  Skin: Warm, dry, no rashes.  Extremities: No leg edema. No discoloration  Neurological: No tremors  Psych:  Affect/mood is normal, judgement is good, memory is intact, grooming is appropriate.    Assessment/Plan:     Samantha was seen today for dyslipidemia.    Diagnoses and all orders for this visit:    Dyslipidemia  Chronic, LDL now at 141. Declines statin therapy. ASCVD at 4.1%. Recommend recheck of lipid panel in 6-12 months. May consider CAC for further risk stratification.     IFG (impaired fasting glucose)  Stable. Recommend routine surveillance.    Simple Right ovarian cyst  Stable. Followed by GYN with every 6 months u/s.     Obesity (BMI 30-39.9)  Chronic. Patient encouraged to reduce excess " calorie consumption. Encouraged regular exercise. Discussed long term sequelae of obesity.     Need for shingles vaccine  - Shingles Vaccine (Shingrix)      Return in about 1 year (around 11/11/2022).    Amalia SMILEY.

## 2021-11-22 ENCOUNTER — PHYSICAL THERAPY (OUTPATIENT)
Dept: PHYSICAL THERAPY | Facility: REHABILITATION | Age: 66
End: 2021-11-22
Attending: NURSE PRACTITIONER
Payer: COMMERCIAL

## 2021-11-22 DIAGNOSIS — M25.551 RIGHT HIP PAIN: ICD-10-CM

## 2021-11-22 PROCEDURE — 97110 THERAPEUTIC EXERCISES: CPT

## 2021-11-22 NOTE — OP THERAPY DAILY TREATMENT
Outpatient Physical Therapy  DAILY TREATMENT     [facility] [place]  [address]  [facility] [address]  Phone:  [phone]  Fax:  [phone]    Date: 11/22/2021    Patient: Samantha Yu  YOB: 1955  MRN: [id number]     Time Calculation    Start time: 0345  Stop time: 0430 Time Calculation (min): 45 minutes         Chief Complaint: hip/ back pain  Visit #: 4    SUBJECTIVE:  Reports feeling better/ stronger in her R leg. Notes other pains in her R foot and hands.     OBJECTIVE:  Current objective measures: Hip flexion 4+/5          Therapeutic Exercises (CPT 19267):     1. Nustep, lvl 3 x 7min    2. Shuttle, Supien DL 6c x 20, 7c x 20    3. Hip switch, 2min hooklying    4. Ball bridges, x20    5. Supine ball lumbar twist , 3min    7. SLR band assisted , x20 purple band  (given for HEP at home)    8. Sidelying clamshell, x 30      Therapeutic Exercise Summary: HEP instruction/performance and development. Handout provided and exercises located below:  Access Code: [email or web address]  URL: [email or web address]  Date: 10/25/2021  Prepared by: Lloyd Sears    Exercises        Supine Hip Internal and External Rotation - 1-2 x daily - 2 sets - 20 reps      Neo Stretch on Table - 1-2 x daily - 2 sets - 30 hold      Clamshell - 1 x daily - 2 sets - 15 reps  SLR band assisted 1x10- 2x daily      Time-based treatments/modalities:    Physical Therapy Timed Treatment Charges  Therapeutic exercise minutes (CPT 45493): 45 minutes      Pain rating (1-10) before treatment:  0  Pain rating (1-10) after treatment:  0    ASSESSMENT:   Response to treatment: Overall exercises tolerated without pain. Continued hip strengthening indicated. Is on a the wait list for one time a week.     PLAN/RECOMMENDATIONS:   Plan for treatment: therapy treatment to continue next visit.  Planned interventions for next visit: continue with current treatment.

## 2021-12-09 ENCOUNTER — PHYSICAL THERAPY (OUTPATIENT)
Dept: PHYSICAL THERAPY | Facility: REHABILITATION | Age: 66
End: 2021-12-09
Attending: NURSE PRACTITIONER
Payer: COMMERCIAL

## 2021-12-09 DIAGNOSIS — M25.551 RIGHT HIP PAIN: ICD-10-CM

## 2021-12-09 PROCEDURE — 97110 THERAPEUTIC EXERCISES: CPT

## 2021-12-09 NOTE — OP THERAPY PROGRESS SUMMARY
Outpatient Physical Therapy  PROGRESS SUMMARY NOTE      [facility] [place]  [address]  [place] [address]  Phone:  [phone]  Fax:  [phone]    Date of Visit: 12/09/2021    Patient: Samantha Yu  YOB: 1955  MRN: [id number]     Referring Provider: KERRI Gillespie [facility] [address]  [place] [address]   Referring Diagnosis Pain in right hip [M25.551]     Visit Diagnoses     ICD-10-CM   1. Right hip pain  M25.551       Rehab Potential: good    Progress Report Period: through 12/09/21    Functional Assessment Used  PT Functional Assessment Tool Used: LEFS  PT Functional Assessment Score: 57/80       Objective Findings and Assessment:   Patient progression towards goals: Samantha Yu has completed 5 physical therapy sessions on her current prescription. She has improved function, decreased pain, improved strength, increased ROM, improved gait and she continues to progress with her home exercise program. Recommend continued physical therapy in order to further improve function, improve strength, and progress patient to full independent home exercise program. Thank you for the opportunity to assist you and your patient.    Objective findings and assessment details: Hip flexion 4+/5, hip ROM WNL    Goals:   Short Term Goals:   1. Patient will be Independent with prescribed Home Exercise Program (HEP) and will be able to demonstrate exercises without cues for improved overall symptoms/activity tolerance.   2. Pt will improve frequency of getting out of a chair at work/ home for improved overall joint health and improved positional tolerances.   Previous short term goals met.   Short term goal time span:  2-4 weeks      Long Term Goals:    3. Pt will improve hip flexion to 5/5 for improved ability and tolerance   4. Pt will improve LEFS score to greater than 65/80 indicative of improved function and reduced perceived  disability.  Long term goal time span:  4-6 weeks    Plan:   Planned therapy interventions:  Therapeutic Exercise (CPT 82928), Neuromuscular Re-education (CPT 17249), E Stim Unattended (CPT 29373) and Manual Therapy (CPT 53834)  Frequency:  1x week  Duration in weeks:  6    Referring provider co-signature:  I have reviewed this plan of care and my co-signature certifies the need for services.     Certification Period: 12/09/2021 to 01/20/22    Physician Signature: ________________________________ Date: ______________

## 2021-12-09 NOTE — OP THERAPY DAILY TREATMENT
Outpatient Physical Therapy  DAILY TREATMENT     [facility] [place]  [address]  [facility] [address]  Phone:  [phone]  Fax:  [phone]    Date: 12/09/2021    Patient: Samantha Yu  YOB: 1955  MRN: [id number]     Time Calculation    Start time: 1115  Stop time: 1155 Time Calculation (min): 40 minutes         Chief Complaint: hip/ back pain  Visit #: 5    SUBJECTIVE:  Reports mild compliance the last 2 weeks with the holiday and with work picking up. Reports the R hip doing better overall but still bothering her if she sits too long.     OBJECTIVE:  Current objective measures: Hip flexion 4+/5  PT Functional Assessment Tool Used: LEFS  PT Functional Assessment Score: 57/80       Therapeutic Exercises (CPT 17458):     1. Nustep, lvl 3 x 7min    2. Shuttle, Supien DL 6c x40,, 7c x 20    3. Hip switch, 2min hooklying    4. Ball bridges, x20    5. Supine ball lumbar twist , 3min    7. SLR band assisted , x20 purple band  (given for HEP at home)    8. Sidelying clamshell, x 30      Therapeutic Exercise Summary: HEP instruction/performance and development. Handout provided and exercises located below:  Access Code: [email or web address]  URL: [email or web address]  Date: 10/25/2021  Prepared by: Lloyd Sears    Exercises        Supine Hip Internal and External Rotation - 1-2 x daily - 2 sets - 20 reps      Neo Stretch on Table - 1-2 x daily - 2 sets - 30 hold      Clamshell - 1 x daily - 2 sets - 15 reps  SLR band assisted 1x10- 2x daily      Time-based treatments/modalities:    Physical Therapy Timed Treatment Charges  Therapeutic exercise minutes (CPT 49421): 40 minutes      Pain rating (1-10) before treatment:  0  Pain rating (1-10) after treatment:  0    ASSESSMENT:   Response to treatment: HEP reimplemented and previous treatment replicated. Overall exercises tolerated without pain. Continued hip strengthening indicated. Was able to schedule appts for the next  few weeks.     PLAN/RECOMMENDATIONS:   Plan for treatment: therapy treatment to continue next visit.  Planned interventions for next visit: continue with current treatment.

## 2021-12-16 ENCOUNTER — PHYSICAL THERAPY (OUTPATIENT)
Dept: PHYSICAL THERAPY | Facility: REHABILITATION | Age: 66
End: 2021-12-16
Attending: NURSE PRACTITIONER
Payer: COMMERCIAL

## 2021-12-16 DIAGNOSIS — M25.551 RIGHT HIP PAIN: ICD-10-CM

## 2021-12-16 PROCEDURE — 97110 THERAPEUTIC EXERCISES: CPT

## 2021-12-16 NOTE — OP THERAPY DAILY TREATMENT
Outpatient Physical Therapy  DAILY TREATMENT     [facility] [place]  [address]  [place] [facility] [address]  Phone:  [phone]  Fax:  [phone]    Date: 12/16/2021    Patient: Samantha Yu  YOB: 1955  MRN: [id number]     Time Calculation    Start time: 0347  Stop time: 0415 Time Calculation (min): 28 minutes         Chief Complaint: hip/ back pain  Visit #: 6    SUBJECTIVE:    Notes that she didn't notice any increase in pain after last visit. Reports the R hip doing better overall but still bothering her if she sits too long or walks too long.     OBJECTIVE:  Current objective measures: Hip flexion 4+/5          Therapeutic Exercises (CPT 88515):     1. Nustep, lvl 3 x 10min    2. Shuttle, Supine DL 6c x40,, 7c x 20, SL 4c x 25 each     3. Hip switch, 2min hooklying    4. Ball bridges, x20    5. Supine ball lumbar twist , 1min    6. Hip hikes , x 20 each    7. SLR , x 20    8. Sidelying clamshell, x 20      Therapeutic Exercise Summary: HEP instruction/performance and development. Handout provided and exercises located below:  Access Code: [email or web address]  URL: [email or web address]  Date: 10/25/2021  Prepared by: Lloyd Sears    Exercises        Supine Hip Internal and External Rotation - 1-2 x daily - 2 sets - 20 reps      Neo Stretch on Table - 1-2 x daily - 2 sets - 30 hold      Clamshell - 1 x daily - 2 sets - 15 reps  SLR band assisted 1x10- 2x daily      Time-based treatments/modalities:    Physical Therapy Timed Treatment Charges  Therapeutic exercise minutes (CPT 41722): 28 minutes      Pain rating (1-10) before treatment:  0  Pain rating (1-10) after treatment:  0    ASSESSMENT:   Response to treatment: Strength maintained but improved overall. Symptoms improved overall but no specific changes reported since last visit. Compliance with HEP instructed. Overall exercises tolerated without pain. Continued hip strengthening indicated.      PLAN/RECOMMENDATIONS:   Plan for treatment: therapy treatment to continue next visit.  Planned interventions for next visit: continue with current treatment.

## 2021-12-30 ENCOUNTER — PHYSICAL THERAPY (OUTPATIENT)
Dept: PHYSICAL THERAPY | Facility: REHABILITATION | Age: 66
End: 2021-12-30
Attending: NURSE PRACTITIONER
Payer: COMMERCIAL

## 2021-12-30 DIAGNOSIS — M25.551 RIGHT HIP PAIN: ICD-10-CM

## 2021-12-30 PROCEDURE — 97110 THERAPEUTIC EXERCISES: CPT

## 2021-12-30 NOTE — OP THERAPY DAILY TREATMENT
Outpatient Physical Therapy  DAILY TREATMENT     [facility] Therapy [place]  [address]  [facility] [place] [facility] [address]  Phone:  [phone]  Fax:  [phone]    Date: 12/30/2021    Patient: Samantha Yu  YOB: 1955  MRN: [id number]     Time Calculation    Start time: 0300  Stop time: 0340 Time Calculation (min): 40 minutes         Chief Complaint: hip/ back pain  Visit #: 7    SUBJECTIVE:  Reports that some of the exercises are very tiresome and difficult to complete. Otherwise is doing well.     OBJECTIVE:  Current objective measures: Hip flexion R 4+/5          Therapeutic Exercises (CPT 30457):     1. Nustep, lvl 4 x  10min    2. Shuttle, Supine DL 5c x25,, 7c x 20, SL 4c x 25 each     3. Hip switch, 1min x 2     4. Ball bridges, x20    5. Supine ball lumbar twist , 1min    6. Hip hikes , x 20 each    7. SLR , 3x 5    8. Sidelying clamshell, x 20      Therapeutic Exercise Summary: HEP instruction/performance and development. Handout provided and exercises located below:  Access Code: [email or web address]  URL: [email or web address]  Date: 10/25/2021  Prepared by: Lloyd Sears    Exercises        Supine Hip Internal and External Rotation - 1-2 x daily - 2 sets - 20 reps      Neo Stretch on Table - 1-2 x daily - 2 sets - 30 hold      Clamshell - 1 x daily - 2 sets - 15 reps  SLR band assisted 1x10- 2x daily      Time-based treatments/modalities:    Physical Therapy Timed Treatment Charges  Therapeutic exercise minutes (CPT 55521): 40 minutes      Pain rating (1-10) before treatment:  0  Pain rating (1-10) after treatment:  0    ASSESSMENT:   Response to treatment: Strength maintained and improved overall. HEP verbally adjusted to be fewer reps and more sets. Pt to f/u in one week.     PLAN/RECOMMENDATIONS:   Plan for treatment: therapy treatment to continue next visit.  Planned interventions for next visit: continue with current treatment.

## 2022-01-06 ENCOUNTER — PHYSICAL THERAPY (OUTPATIENT)
Dept: PHYSICAL THERAPY | Facility: REHABILITATION | Age: 67
End: 2022-01-06
Attending: NURSE PRACTITIONER
Payer: COMMERCIAL

## 2022-01-06 DIAGNOSIS — M25.551 RIGHT HIP PAIN: ICD-10-CM

## 2022-01-06 PROCEDURE — 97110 THERAPEUTIC EXERCISES: CPT

## 2022-01-06 NOTE — OP THERAPY DAILY TREATMENT
Outpatient Physical Therapy  DAILY TREATMENT     [facility] [place]  [address]  [facility] [address]  Phone:  [phone]  Fax:  [phone]    Date: 01/06/2022    Patient: Samantha Yu  YOB: 1955  MRN: [id number]     Time Calculation    Start time: 0430  Stop time: 0515 Time Calculation (min): 45 minutes         Chief Complaint: hip/ back pain  Visit #: 8    SUBJECTIVE:  Notes she had pain at work sitting but has been trying to stretch her hip and get up more frequently.     OBJECTIVE:  Current objective measures: Hip flexion R 4+/5          Therapeutic Exercises (CPT 78646):     1. Nustep, lvl 4 x  10min    2. Shuttle, Supine DL 5c x25,, 7c x 20, SL 4c x15-20 each     3. Hip switch, 1min x    4. Ball bridges, x20    5. Supine ball lumbar twist , 1min    6. Hip hikes , x 20 each    7. SLR , 3x 5    8. Neo stretch, 30 sec x 4      Therapeutic Exercise Summary: HEP instruction/performance and development. Handout provided and exercises located below:  Access Code: [email or web address]  URL: [email or web address]  Date: 10/25/2021  Prepared by: Lloyd Sears    Exercises        Supine Hip Internal and External Rotation - 1-2 x daily - 2 sets - 20 reps      Neo Stretch on Table - 1-2 x daily - 2 sets - 30 hold      Clamshell - 1 x daily - 2 sets - 15 reps  SLR band assisted 1x10- 2x daily      Time-based treatments/modalities:    Physical Therapy Timed Treatment Charges  Therapeutic exercise minutes (CPT 47504): 45 minutes      Pain rating (1-10) before treatment:  0  Pain rating (1-10) after treatment:  0    ASSESSMENT:   Response to treatment: Strength maintained and improved overall. Pt to f/u in one week. Continue to address positional changes at work for increased comfort.      PLAN/RECOMMENDATIONS:   Plan for treatment: therapy treatment to continue next visit.  Planned interventions for next visit: continue with current treatment.

## 2022-01-13 ENCOUNTER — PHYSICAL THERAPY (OUTPATIENT)
Dept: PHYSICAL THERAPY | Facility: REHABILITATION | Age: 67
End: 2022-01-13
Attending: NURSE PRACTITIONER
Payer: COMMERCIAL

## 2022-01-13 DIAGNOSIS — M25.551 RIGHT HIP PAIN: ICD-10-CM

## 2022-01-13 PROCEDURE — 97110 THERAPEUTIC EXERCISES: CPT

## 2022-01-13 NOTE — OP THERAPY DAILY TREATMENT
Outpatient Physical Therapy  DAILY TREATMENT     [facility] [place]  [address]  [facility] [address]  Phone:  [phone]  Fax:  [phone]    Date: 01/13/2022    Patient: Samantha Yu  YOB: 1955  MRN: [id number]     Time Calculation    Start time: 0430  Stop time: 0515 Time Calculation (min): 45 minutes         Chief Complaint: hip/ back pain  Visit #: 9    SUBJECTIVE:  Pain comes and goes but is occurring a lot less often if she is more mobile.   Feels better when she is not stuck in her chair as much.   OBJECTIVE:  Current objective measures: Hip flexion R 4+/5          Therapeutic Exercises (CPT 69286):     1. Nustep, lvl 4 x  10min    2. Shuttle, Supine DL 5c x25,, 7c x 20, SL 4c x15-20 each     3. Supine lumbar twist, x 25     4. Ball bridges, x20    5. Supine ball lumbar twist , 1min    6. Hip hikes , x 20 each    7. SLR , HEp reported to be 10+ before fatigue    8. Neo stretch, 30 sec x 4      Therapeutic Exercise Summary: HEP instruction/performance and development. Handout provided and exercises located below:  Access Code: [email or web address]  URL: [email or web address]  Date: 10/25/2021  Prepared by: Lloyd Sears    Exercises        Supine Hip Internal and External Rotation - 1-2 x daily - 2 sets - 20 reps      Neo Stretch on Table - 1-2 x daily - 2 sets - 30 hold      Clamshell - 1 x daily - 2 sets - 15 reps  SLR band assisted 1x10- 2x daily      Time-based treatments/modalities:    Physical Therapy Timed Treatment Charges  Therapeutic exercise minutes (CPT 94557): 45 minutes      Pain rating (1-10) before treatment:  0  Pain rating (1-10) after treatment:  0    ASSESSMENT:   Response to treatment: Strength maintained and improved overall. Pt to f/u in 2 weeks for likely discharge.     PLAN/RECOMMENDATIONS:   Plan for treatment: therapy treatment to continue next visit.  Planned interventions for next visit: continue with current treatment.

## 2022-01-27 ENCOUNTER — APPOINTMENT (OUTPATIENT)
Dept: PHYSICAL THERAPY | Facility: REHABILITATION | Age: 67
End: 2022-01-27
Attending: NURSE PRACTITIONER
Payer: COMMERCIAL

## 2022-02-10 ENCOUNTER — OFFICE VISIT (OUTPATIENT)
Dept: MEDICAL GROUP | Facility: MEDICAL CENTER | Age: 67
End: 2022-02-10
Payer: COMMERCIAL

## 2022-02-10 ENCOUNTER — HOSPITAL ENCOUNTER (OUTPATIENT)
Dept: RADIOLOGY | Facility: MEDICAL CENTER | Age: 67
End: 2022-02-10
Attending: STUDENT IN AN ORGANIZED HEALTH CARE EDUCATION/TRAINING PROGRAM
Payer: COMMERCIAL

## 2022-02-10 VITALS
TEMPERATURE: 98.7 F | HEART RATE: 87 BPM | SYSTOLIC BLOOD PRESSURE: 138 MMHG | DIASTOLIC BLOOD PRESSURE: 78 MMHG | HEIGHT: 66 IN | OXYGEN SATURATION: 93 % | RESPIRATION RATE: 16 BRPM | BODY MASS INDEX: 32.14 KG/M2 | WEIGHT: 200 LBS
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
## 2022-02-10 DIAGNOSIS — S06.0X0A CONCUSSION WITHOUT LOSS OF CONSCIOUSNESS, INITIAL ENCOUNTER: ICD-10-CM

## 2022-02-10 PROCEDURE — 70450 CT HEAD/BRAIN W/O DYE: CPT

## 2022-02-10 PROCEDURE — 99204 OFFICE O/P NEW MOD 45 MIN: CPT | Performed by: STUDENT IN AN ORGANIZED HEALTH CARE EDUCATION/TRAINING PROGRAM

## 2022-02-10 RX ORDER — ONDANSETRON 4 MG/1
4 TABLET, FILM COATED ORAL EVERY 4 HOURS PRN
Qty: 20 TABLET | Refills: 0 | Status: SHIPPED | OUTPATIENT
Start: 2022-02-10 | End: 2022-02-20

## 2022-02-10 ASSESSMENT — PATIENT HEALTH QUESTIONNAIRE - PHQ9: CLINICAL INTERPRETATION OF PHQ2 SCORE: 0

## 2022-02-10 ASSESSMENT — FIBROSIS 4 INDEX: FIB4 SCORE: 0.66

## 2022-02-10 NOTE — PROGRESS NOTES
"Subjective:     CC: Fall with head trauma    HPI:   Samantha presents today with complaint of ongoing symptoms after she tripped and fell and had direct impact to her head.  Since then she has been feeling nauseous to the point of coming close to vomiting, has had headaches that are persistent and severe, also reports that her thinking is not clear, has forgotten a few small things, and has felt that her balance is not very good.  She has been taking naproxen for the headache, and she reports that she takes it frequently including before and leading up to the fall.    No problems updated.    Current Outpatient Medications Ordered in Epic   Medication Sig Dispense Refill   • ondansetron (ZOFRAN) 4 MG Tab tablet Take 1 Tablet by mouth every four hours as needed for Nausea/Vomiting for up to 10 days. 20 Tablet 0   • travoprost (TRAVATAN Z) 0.004 % Solution Place 1 Drop in both eyes every day.       No current [facility]-ordered facility-administered medications on file.       ROS:  Gen: no fevers/chills, no changes in weight  Eyes: no changes in vision  ENT: no sore throat, no hearing loss, no bloody nose  Pulm: no sob, no cough  CV: no chest pain, no palpitations  GI: no nausea/vomiting, no diarrhea  : no dysuria  MSk: no myalgias  Skin: no rash  Neuro: See HPI  Heme/Lymph: no easy bruising      Objective:     Exam:  /78 (BP Location: Left arm)   Pulse 87   Temp 37.1 °C (98.7 °F)   Resp 16   Ht 1.676 m (5' 6\")   Wt 90.7 kg (200 lb)   SpO2 93%   BMI 32.28 kg/m²  Body mass index is 32.28 kg/m².    Gen: Alert and oriented, No apparent distress.  Neck: Neck is supple without lymphadenopathy.  Lungs: Normal effort, CTA bilaterally, no wheezes, rhonchi, or rales  CV: Regular rate and rhythm. No murmurs, rubs, or gallops.  Ext: No clubbing, cyanosis, edema.  Neuro: Gait instability on tandem walking, otherwise unremarkable, CN II through XII intact, no dysdiadochokinesia, no changes in sensation or " strength    Assessment & Plan:     66 y.o. female with the following -     1. Concussion without loss of consciousness, initial encounter  2. Fall from ground level  Given persistent symptoms after fall with head trauma will get CT head to rule out ICH.  Patient does frequently take Naprosyn and had been taking it prior to the injury which does increase her risk of bleed.  Advised her to take Tylenol 1 g every 6 to 8 hours for the headache and hold off on taking Naprosyn until we rule out ICH.  Have also given her prescription for Zofran for her nausea.  Patient advised that if she has any significant or sudden changes she needs to call 911 or present to the emergency room  - CT-HEAD W/O; Future      Return if symptoms worsen or fail to improve.    Please note that this dictation was created using voice recognition software. I have made every reasonable attempt to correct obvious errors, but I expect that there are errors of grammar and possibly content that I did not discover before finalizing the note.

## 2022-02-10 NOTE — LETTER
[facility] [address] [facility] [address]     February 10, 2022    Patient: Samantha Yu   YOB: 1955   Date of Visit: 2/10/2022       To Whom It May Concern:    Samantha Yu was seen and treated in our department on 2/10/2022. Please excuse her for the week 2/7-2/11    Sincerely,     Niko Jarquin M.D.

## 2022-03-22 ENCOUNTER — TELEPHONE (OUTPATIENT)
Dept: PHYSICAL THERAPY | Facility: REHABILITATION | Age: 67
End: 2022-03-22
Payer: COMMERCIAL

## 2022-03-22 NOTE — OP THERAPY DISCHARGE SUMMARY
Outpatient Physical Therapy  DISCHARGE SUMMARY NOTE      [facility] [place]  [address]  [place] [facility] [address]  Phone:  [phone]  Fax:  [phone]    Date of Visit: 03/22/2022    Patient: Samantha Yu  YOB: 1955  MRN: [id number]     Referring Provider: KERRI Gillespie  [address] [place] [address]   Referring Diagnosis Right hip pain [M25.551]       Your patient is being discharged from Physical Therapy with the following comments:   · Goals partially met  · Patient has failed to schedule or reschedule follow-up visits    Comments:  Samantha Yu has been discharged due to a lapse in care greater than 30 days. She did not attend her last session where it was planned to promote her to independence with exercises. Thank you for the opportunity to assist you and your patient.       Limitations Remaining:  Hip flexion R 4+/5    Recommendations:  Continue HEP, f/u with PCP as needed    Lloyd Sears, PT, DPT    Date: 3/22/2022

## 2022-05-12 ENCOUNTER — OFFICE VISIT (OUTPATIENT)
Dept: MEDICAL GROUP | Facility: MEDICAL CENTER | Age: 67
End: 2022-05-12
Payer: COMMERCIAL

## 2022-05-12 VITALS
TEMPERATURE: 98.2 F | SYSTOLIC BLOOD PRESSURE: 122 MMHG | HEART RATE: 108 BPM | HEIGHT: 66 IN | RESPIRATION RATE: 16 BRPM | WEIGHT: 192 LBS | OXYGEN SATURATION: 96 % | BODY MASS INDEX: 30.86 KG/M2 | DIASTOLIC BLOOD PRESSURE: 70 MMHG

## 2022-05-12 DIAGNOSIS — N83.201 RIGHT OVARIAN CYST: ICD-10-CM

## 2022-05-12 PROCEDURE — 99213 OFFICE O/P EST LOW 20 MIN: CPT | Performed by: STUDENT IN AN ORGANIZED HEALTH CARE EDUCATION/TRAINING PROGRAM

## 2022-05-12 ASSESSMENT — FIBROSIS 4 INDEX: FIB4 SCORE: 0.66

## 2022-05-12 NOTE — PROGRESS NOTES
"Subjective:     CC: RLQ pain    HPI:   Samantha presents today with    Right lower quadrant pain  Patient presents today for concern about right lower quadrant pain.  Patient notes that pain started suddenly and was severe 4 days ago.  Patient notes no relief even when she took leftover Norco.  Patient notes that since then the pain has waxed and waned.  Patient denies any nausea or vomiting.  Patient denies change in bowel movement.  Patient denies dysuria, urgency, frequency.  Patient denies any discharge.  Patient has previously been diagnosed with right ovarian cyst and was recommended to follow-up in 6 months with repeat ultrasound but has not completed this.  Patient notes that pain is in same area of previous pain.      ROS:  Gen: no fevers/chills   Pulm: no sob, no cough  CV: no chest pain, no palpitations  GI: no nausea/vomiting, no diarrhea      Objective:     Exam:  /70 (BP Location: Right arm, Patient Position: Sitting, BP Cuff Size: Adult)   Pulse (!) 108   Temp 36.8 °C (98.2 °F) (Temporal)   Resp 16   Ht 1.676 m (5' 6\")   Wt 87.1 kg (192 lb)   SpO2 96%   BMI 30.99 kg/m²  Body mass index is 30.99 kg/m².    Gen: Alert and oriented, No apparent distress.  Neck: Neck is supple without lymphadenopathy.  Lungs: Normal effort, CTA bilaterally, no wheezes, rhonchi, or rales  CV: Regular rate and rhythm. No murmurs, rubs, or gallops.  Ext: No clubbing, cyanosis, edema.  Abdomen: Tender to palpation right lower quadrant, no rebound, no guarding, nondistended.    Assessment & Plan:     66 y.o. female with the following -     1. Right ovarian cyst  Acute, stable.  Patient notes that pain has improved from 4 days ago.  Patient advised to treat pain with ibuprofen/Tylenol as needed.  We will get repeat ultrasound to further evaluate cyst discussed signs and symptoms that would warrant emergent evaluation in the ED.  Patient advised to follow-up.  - US-PELVIC COMPLETE (TRANSABDOMINAL/TRANSVAGINAL) (COMBO); " Sent from [facility], sick for a few days, started with a cough, fever last night, up to 101.3, last med given last night, she seemed to be struggling with breathing last night, had influenza few months ago, also rash to back Future      Return if symptoms worsen or fail to improve.    Please note that this dictation was created using voice recognition software. I have made every reasonable attempt to correct obvious errors, but I expect that there are errors of grammar and possibly content that I did not discover before finalizing the note.

## 2022-06-01 ENCOUNTER — HOSPITAL ENCOUNTER (OUTPATIENT)
Dept: RADIOLOGY | Facility: MEDICAL CENTER | Age: 67
End: 2022-06-01
Attending: STUDENT IN AN ORGANIZED HEALTH CARE EDUCATION/TRAINING PROGRAM
Payer: COMMERCIAL

## 2022-06-01 DIAGNOSIS — N83.201 RIGHT OVARIAN CYST: ICD-10-CM

## 2022-06-01 PROCEDURE — 76830 TRANSVAGINAL US NON-OB: CPT

## 2022-06-16 ENCOUNTER — TELEPHONE (OUTPATIENT)
Dept: MEDICAL GROUP | Facility: MEDICAL CENTER | Age: 67
End: 2022-06-16
Payer: COMMERCIAL

## 2022-06-16 DIAGNOSIS — Z12.12 SCREENING FOR COLORECTAL CANCER: ICD-10-CM

## 2022-06-16 DIAGNOSIS — Z12.11 SCREENING FOR COLORECTAL CANCER: ICD-10-CM

## 2022-06-16 NOTE — TELEPHONE ENCOUNTER
Received a call from [facility] stating that the Blood Occult Fit test came in w/ a label on the specimen and will need a recollect. Please place new lab order and I can call pt to  the stool sample kit at her local Lab [facility].

## 2022-11-02 ENCOUNTER — DOCUMENTATION (OUTPATIENT)
Dept: HEALTH INFORMATION MANAGEMENT | Facility: OTHER | Age: 67
End: 2022-11-02

## 2022-11-02 ENCOUNTER — PATIENT MESSAGE (OUTPATIENT)
Dept: HEALTH INFORMATION MANAGEMENT | Facility: OTHER | Age: 67
End: 2022-11-02

## 2022-11-08 ENCOUNTER — TELEPHONE (OUTPATIENT)
Dept: MEDICAL GROUP | Facility: MEDICAL CENTER | Age: 67
End: 2022-11-08

## 2022-11-08 DIAGNOSIS — H40.89 OTHER GLAUCOMA OF BOTH EYES: ICD-10-CM

## 2022-11-11 PROBLEM — I73.9 PERIPHERAL VASCULAR DISEASE, UNSPECIFIED (HCC): Status: ACTIVE | Noted: 2022-11-11

## 2023-05-10 ENCOUNTER — TELEPHONE (OUTPATIENT)
Dept: HEALTH INFORMATION MANAGEMENT | Facility: OTHER | Age: 68
End: 2023-05-10

## (undated) DEVICE — KIT ANESTHESIA W/CIRCUIT & 3/LT BAG W/FILTER (20EA/CA)

## (undated) DEVICE — WATER IRRIGATION STERILE 1000ML (12EA/CA)

## (undated) DEVICE — CLOSURE WOUND 1/4 X 4 (STERI - STRIP) (50/BX 4BX/CA)

## (undated) DEVICE — GLOVE, LITE (PAIR)

## (undated) DEVICE — NEPTUNE 4 PORT MANIFOLD - (20/PK)

## (undated) DEVICE — SUTURE 3-0 MONOCRYL PLUS PS-2 - (12/BX)

## (undated) DEVICE — PROTECTOR ULNA NERVE - (36PR/CA)

## (undated) DEVICE — DRILL

## (undated) DEVICE — STERI STRIP COMPOUND BENZOIN - TINCTURE 0.6ML WITH APPLICATOR (40EA/BX)

## (undated) DEVICE — SODIUM CHL IRRIGATION 0.9% 1000ML (12EA/CA)

## (undated) DEVICE — SUCTION INSTRUMENT YANKAUER BULBOUS TIP W/O VENT (50EA/CA)

## (undated) DEVICE — KIT ROOM DECONTAMINATION

## (undated) DEVICE — ELECTRODE 850 FOAM ADHESIVE - HYDROGEL RADIOTRNSPRNT (50/PK)

## (undated) DEVICE — SUTURE 2-0 VICRYL PLUS SH - 8 X 18 INCH (12/BX)

## (undated) DEVICE — CHLORAPREP 26 ML APPLICATOR - ORANGE TINT(25/CA)

## (undated) DEVICE — BANDAGEESMARK  4X9' BLUE LF - 20/CS"

## (undated) DEVICE — TOURNIQUET, STERILE 34 (BLUE)

## (undated) DEVICE — ELECTRODE DUAL RETURN W/ CORD - (50/PK)

## (undated) DEVICE — CANISTER SUCTION RIGID RED 1500CC (40EA/CA)

## (undated) DEVICE — GOWN WARMING STANDARD FLEX - (30/CA)

## (undated) DEVICE — GLOVE BIOGEL SZ 8 SURGICAL PF LTX - (50PR/BX 4BX/CA)

## (undated) DEVICE — BLOCK

## (undated) DEVICE — MASK AIRWAY SIZE 3 UNIQUE SILICON (10/BX)

## (undated) DEVICE — MASK ANESTHESIA ADULT  - (100/CA)

## (undated) DEVICE — HEAD HOLDER JUNIOR/ADULT

## (undated) DEVICE — TUBE CONNECTING SUCTION - CLEAR PLASTIC STERILE 72 IN (50EA/CA)

## (undated) DEVICE — DRAPE C-ARM LARGE 41IN X 74 IN - (10/BX 2BX/CA)

## (undated) DEVICE — SUTURE 3-0 VICRYL PLUS SH - 8X 18 INCH (12/BX)

## (undated) DEVICE — PACK LOWER EXTREMITY - (2/CA)

## (undated) DEVICE — LACTATED RINGERS INJ 1000 ML - (14EA/CA 60CA/PF)

## (undated) DEVICE — SUTURE GENERAL

## (undated) DEVICE — OLIVE WIRE

## (undated) DEVICE — PADDING CAST 6 IN STERILE - 6 X 4 YDS (24/CA)

## (undated) DEVICE — SENSOR SPO2 NEO LNCS ADHESIVE (20/BX) SEE USER NOTES